# Patient Record
Sex: MALE | Race: WHITE | NOT HISPANIC OR LATINO | Employment: FULL TIME | URBAN - NONMETROPOLITAN AREA
[De-identification: names, ages, dates, MRNs, and addresses within clinical notes are randomized per-mention and may not be internally consistent; named-entity substitution may affect disease eponyms.]

---

## 2018-05-14 ENCOUNTER — OFFICE VISIT CONVERTED (OUTPATIENT)
Dept: FAMILY MEDICINE CLINIC | Age: 28
End: 2018-05-14
Attending: NURSE PRACTITIONER

## 2018-09-05 ENCOUNTER — OFFICE VISIT CONVERTED (OUTPATIENT)
Dept: FAMILY MEDICINE CLINIC | Age: 28
End: 2018-09-05
Attending: NURSE PRACTITIONER

## 2018-12-21 ENCOUNTER — OFFICE VISIT CONVERTED (OUTPATIENT)
Dept: FAMILY MEDICINE CLINIC | Age: 28
End: 2018-12-21
Attending: NURSE PRACTITIONER

## 2019-04-01 ENCOUNTER — OFFICE VISIT CONVERTED (OUTPATIENT)
Dept: UROLOGY | Facility: CLINIC | Age: 29
End: 2019-04-01
Attending: UROLOGY

## 2019-04-26 ENCOUNTER — PROCEDURE VISIT CONVERTED (OUTPATIENT)
Dept: UROLOGY | Facility: CLINIC | Age: 29
End: 2019-04-26
Attending: UROLOGY

## 2020-03-09 ENCOUNTER — OFFICE VISIT CONVERTED (OUTPATIENT)
Dept: FAMILY MEDICINE CLINIC | Age: 30
End: 2020-03-09
Attending: NURSE PRACTITIONER

## 2020-12-19 ENCOUNTER — OFFICE VISIT CONVERTED (OUTPATIENT)
Dept: FAMILY MEDICINE CLINIC | Age: 30
End: 2020-12-19
Attending: NURSE PRACTITIONER

## 2021-05-15 VITALS — WEIGHT: 262 LBS | RESPIRATION RATE: 16 BRPM | HEIGHT: 71 IN | BODY MASS INDEX: 36.68 KG/M2

## 2021-05-18 NOTE — PROGRESS NOTES
Inderjit Lala 1990     Office/Outpatient Visit    Visit Date: Mon, May 14, 2018 03:21 pm    Provider: Fadia Acosta N.P. (Assistant: Remedios Cleveland RN)    Location: [facility]        Electronically signed by Fadia Acosta N.P. on  05/14/2018 05:28:30 PM                             SUBJECTIVE:        CC:     Mr. Lala is a 27 year old White male.  Medication Refill         HPI:         Patient presents with anxiety.  Current treatment includes Zoloft.  Doing well.  He would like a refill.      ROS:     CONSTITUTIONAL:  Negative for chills, fatigue, fever, and weight change.      CARDIOVASCULAR:  Negative for chest pain, palpitations, tachycardia, orthopnea, and edema.      RESPIRATORY:  Negative for cough, dyspnea, and hemoptysis.      INTEGUMENTARY:  Positive for wart(s) (wart on foot).  2 places on his foot, no pain, recently noticed     NEUROLOGICAL:  Negative for dizziness, headaches, paresthesias, and weakness.          [facility]/:     Last Reviewed on 5/14/2018 03:54 PM by Fadia Acosta    Past Medical History:             PAST MEDICAL HISTORY         Chicken pox     Hypertension    Aortic Regurgitation     epididymitis         CURRENT MEDICAL PROVIDERS:    Cardiologist: Dr Dwayne Barry    Dermatologist: sarah silvestre    Urologist: [facility]         PREVENTIVE HEALTH MAINTENANCE             INFLUENZA VACCINE: declines, understands reason for immunization         Surgical History:         Pressure Equalization Tubes: 1994 and 1998;     Arthroscopy: left knee; 2009;     Other Surgeries:    wisdom teeth extractions;         Family History:     Father: Arrhythmia ( Afib ); Congestive Heart Failure; Hypertension; Hyperlipidemia;  Type 2 Diabetes     Mother: Hypertension;  Anxiety; Depression     Brother(s): 2 brother(s) total;  Hypertension     Sister(s): 3 sister(s) total;  Hypertension     Paternal Grandmother: Type 2 Diabetes     Maternal Grandmother: Congestive Heart  Failure         Social History:     Occupation: [employer];     Marital Status:      Children: 1 child         Tobacco/Alcohol/Supplements:     Last Reviewed on 5/14/2018 03:26 PM by Remedios Cleveland    Tobacco: He has never smoked.          Alcohol: Frequency:    rare;     Caffeine:  He admits to consuming caffeine via -rare.              Immunizations:     DTP  (Diphtheria-Tetanus-whole cell Pertussis) 2/25/1993     DTP  (Diphtheria-Tetanus-whole cell Pertussis) 4/12/1993     DTP  (Diphtheria-Tetanus-whole cell Pertussis) 6/9/1993     DTP  (Diphtheria-Tetanus-whole cell Pertussis) 3/30/1994     DTP  (Diphtheria-Tetanus-whole cell Pertussis) 1/17/1997     DT  (Diphtheria-Tetanus); for children under 7 7/12/2004     Td adult 7/12/2004     Hib PRP-OMP (3-dose) 12/25/1993     Hib PRP-OMP (3-dose) 4/12/1993     Hib PRP-OMP (3-dose) 6/9/1993     Hib PRP-OMP (3-dose) 3/30/1994     Hep B (pedi/adol, 3-dose schedule) 1/1/1993     Hep B (pedi/adol, 3-dose schedule) 2/25/1993     Hep B (pedi/adol, 3-dose schedule) 1/5/1994     Merck 4 Valent HPV vaccine 5/22/2003     Merck 4 Valent HPV vaccine 7/30/2003     Merck 4 Valent HPV vaccine 12/1/2003     Novartis Flu P12 10/9/2007     Hep A, adult dose 4/25/2018     OPV  Poliovirus, live (oral) 2/25/1993     OPV  Poliovirus, live (oral) 4/12/1993     OPV  Poliovirus, live (oral) 3/30/1994     OPV  Poliovirus, live (oral) 1992     MMR  (Measles-Mumps-Rubella), live 1/17/1997     MMR  (Measles-Mumps-Rubella), live 3/30/1994     Varicella, live 1/17/1997     FluMist 10/14/2008     FluMist 8/31/2009     FluMist 10/18/2010     FluMist 9/19/2011     FluMist 10/19/2012     Flumist Quadrivalent 10/31/2013     Influenza A (H1N1) NASAL, Monovalent Live 10/15/2009     Study Fluarix 11/2/2006     Menactra (Meningococcal MCV4P) 6/6/2008     Adacel (Tdap) 5/13/2011     HPV, quadrivalent 5/22/2003     HPV, quadrivalent 7/30/2003     HPV, quadrivalent 12/1/2003         Allergies:     Last  Reviewed on 5/14/2018 03:23 PM by Remedios Cleveland      No Known Drug Allergies.         Current Medications:     Last Reviewed on 5/14/2018 03:24 PM by Remedios Cleveland    Zoloft 50mg Tablet 1 a day     Propranolol HCl 120mg Capsules, Sustained Release Take 1 capsule(s) by mouth daily         OBJECTIVE:        Vitals:         Current: 5/14/2018 3:23:39 PM    Ht:  5 ft, 10 in;  Wt: 239.4 lbs;  BMI: 34.4    T: 98.6 F (oral);  BP: 122/61 mm Hg (left arm, sitting);  P: 54 bpm (left arm (BP Cuff), sitting);  sCr: 1.1 mg/dL;  GFR: 111.29        Exams:     PHYSICAL EXAM:     GENERAL: Vitals recorded well developed, well nourished;  well groomed;  no apparent distress;     RESPIRATORY: normal respiratory rate and pattern with no distress; normal breath sounds with no rales, rhonchi, wheezes or rubs;     CARDIOVASCULAR: normal rate; rhythm is regular;  no systolic murmur;     SKIN: tiny flesh colored papule on plantar surface of right great toe and another tiny flat lesion on  medial forefoot; plantar surface     PSYCHIATRIC:  appropriate affect and demeanor; normal speech pattern; grossly normal memory;         ASSESSMENT           300.02   F41.9  Anxiety              DDx:     078.19   B07.9  Common wart              DDx:         ORDERS:         Meds Prescribed:       Refill of: Zoloft (Sertraline HCl) 50mg Tablet 1 a day  #90 (Ninety) tablet(s) Refills: 3                 PLAN:          Anxiety continue zoloft         FOLLOW-UP: Schedule follow-up appointments on a p.r.n. basis.            Prescriptions:       Refill of: Zoloft (Sertraline HCl) 50mg Tablet 1 a day  #90 (Ninety) tablet(s) Refills: 3          Common wart discussed warts, can try OTC treatment if symptoms increase or follow up             Patient Recommendations:        For  Anxiety:     Schedule follow-up appointments as needed.              CHARGE CAPTURE           **Please note: ICD descriptions below are intended for billing purposes only and may not  represent clinical diagnoses**        Primary Diagnosis:         300.02 Anxiety            F41.9    Anxiety disorder, unspecified              Orders:          81531   Office/outpatient visit; established patient, level 3  (In-House)           078.19 Common wart            B07.9    Viral wart, unspecified

## 2021-05-18 NOTE — PROGRESS NOTES
Inderjit Lala  1990     Office/Outpatient Visit    Visit Date: Sat, Dec 19, 2020 09:13 am    Provider: Loren Fallon N.P. (Assistant: Fadia Loera LPN)    Location: [facility]        Electronically signed by Loren Fallon N.P. on  12/19/2020 10:44:46 AM                             Subjective:        CC: Mr. Lala is a 29 year old White male.  [facility] needs [facility] to [facility]         HPI:       Here for recent positive Covid + on 12/9/2020, has been home since and is feeling better, no fever, no cough, no n/v/d/c and ready to return to work     ROS:     CONSTITUTIONAL:  Negative for chills, fatigue, fever and weight change.      CARDIOVASCULAR:  Negative for chest pain, orthopnea, paroxysmal nocturnal dyspnea and pedal edema.      RESPIRATORY:  Negative for dyspnea and cough.      GASTROINTESTINAL:  Negative for abdominal pain, heartburn, constipation, diarrhea, and stool changes.      PSYCHIATRIC:  Negative for anxiety and depression.          Past Medical History / Family History / Social History:         Last Reviewed on 12/19/2020 10:44 AM by Loren Fallon    Past Medical History:             PAST MEDICAL HISTORY         Chicken pox     Hypertension    Aortic Regurgitation     epididymitis         CURRENT MEDICAL PROVIDERS:    Cardiologist: Dr Dwayne Barry    Dermatologist: sarah silvestre    Urologist: [facility]         PREVENTIVE HEALTH MAINTENANCE             INFLUENZA VACCINE: declines, understands reason for immunization         Surgical History:         Pressure Equalization Tubes: 1994 and 1998;     Arthroscopy: left knee; 2009;     Other Surgeries:    Vasectomy: 5-2019;     wisdom teeth extractions;         Family History:     Father: Arrhythmia ( Afib ); Congestive Heart Failure; Hypertension; Hyperlipidemia;  Type 2 Diabetes     Mother: Hypertension;  Anxiety; Depression     Brother(s): 2 brother(s) total;  Hypertension     Sister(s): 3  sister(s) total;  Hypertension     Paternal Grandmother: Type 2 Diabetes     Maternal Grandmother: Congestive Heart Failure         Social History:     Occupation: [employer];     Marital Status:      Children: 2 children         Tobacco/Alcohol/Supplements:     Last Reviewed on 12/19/2020 10:44 AM by Loren Fallon    Tobacco: He has never smoked.          Alcohol: Frequency:    rare;     Caffeine:  He admits to consuming caffeine via -rare.          Current Problems:     Last Reviewed on 12/19/2020 10:44 AM by Loren Fallon    Anxiety disorder, unspecified    COVID-19    Encounter for immunization        Immunizations:     DTP  (Diphtheria-Tetanus-whole cell Pertussis) 2/25/1993    DTP  (Diphtheria-Tetanus-whole cell Pertussis) 4/12/1993    DTP  (Diphtheria-Tetanus-whole cell Pertussis) 6/9/1993    DTP  (Diphtheria-Tetanus-whole cell Pertussis) 3/30/1994    DTP  (Diphtheria-Tetanus-whole cell Pertussis) 1/17/1997    DT  (Diphtheria-Tetanus); for children under 7 7/12/2004    Td adult 7/12/2004    Hib PRP-OMP (3-dose) 12/25/1993    Hib PRP-OMP (3-dose) 4/12/1993    Hib PRP-OMP (3-dose) 6/9/1993    Hib PRP-OMP (3-dose) 3/30/1994    Hep B (pedi/adol, 3-dose schedule) 1/1/1993    Hep B (pedi/adol, 3-dose schedule) 2/25/1993    Hep B (pedi/adol, 3-dose schedule) 1/5/1994    Merck 4 Valent HPV vaccine 5/22/2003    Merck 4 Valent HPV vaccine 7/30/2003    Merck 4 Valent HPV vaccine 12/1/2003    Novartis Flu P12 10/9/2007    Hep A, adult dose 4/25/2018    Hep A, adult dose 11/1/2018    OPV  Poliovirus, live (oral) 2/25/1993    OPV  Poliovirus, live (oral) 4/12/1993    OPV  Poliovirus, live (oral) 3/30/1994    OPV  Poliovirus, live (oral) 1992    MMR  (Measles-Mumps-Rubella), live 1/17/1997    MMR  (Measles-Mumps-Rubella), live 3/30/1994    Varicella, live 1/17/1997    Fluzone Quadrivalent (3+ years) 11/1/2018    FluMist 10/14/2008    FluMist 8/31/2009    FluMist 10/18/2010    FluMist 9/19/2011    FluMist  10/19/2012    Flumist Quadrivalent 10/31/2013    Influenza A (H1N1) NASAL, Monovalent Live 10/15/2009    Study Fluarix 11/2/2006    Menactra (Meningococcal MCV4P) 6/6/2008    Adacel (Tdap) 5/13/2011    HPV, quadrivalent 5/22/2003    HPV, quadrivalent 7/30/2003    HPV, quadrivalent 12/1/2003        Allergies:     Last Reviewed on 12/19/2020 10:44 AM by Loren Fallon    No Known Allergies.        Current Medications:     Last Reviewed on 12/19/2020 10:44 AM by Loren Fallon    propranoloL 120 mg oral Capsule, Extended Release 24 hr [Take 2 capsule(s) by mouth daily]    sertraline 50 mg oral tablet [TAKE 1 TABLET DAILY]        Objective:        Vitals:         Current: 12/19/2020 9:18:51 AM    Ht:  5 ft, 10 in;  Wt: 276.8 lbs;  BMI: 39.7T: 97.3 F (oral);  BP: 139/65 mm Hg (left arm, sitting);  P: 47 bpm (left arm (BP Cuff), sitting);  sCr: 1.1 mg/dL;  GFR: 116.36        Exams:     PHYSICAL EXAM:     GENERAL: Vitals recorded well developed, well nourished;  well groomed;  no apparent distress;     RESPIRATORY: normal respiratory rate and pattern with no distress; normal breath sounds with no rales, rhonchi, wheezes or rubs;     CARDIOVASCULAR: normal rate; rhythm is regular;  normal S1; normal S2; no systolic murmur; no cyanosis; no edema;     NEUROLOGIC: GROSSLY INTACT     PSYCHIATRIC:  appropriate affect and demeanor; normal speech pattern; grossly normal memory;         Procedures:     Encounter for immunization    1.              Assessment:         U07.1   COVID-19       Z23   Encounter for immunization           ORDERS:         Procedures Ordered:       77684  Immunization administration; one vaccine  (In-House)              Other Orders:       95213  Influenza virus vaccine, quadrivalent, split virus, preservative free 3 years of age & older  (In-House)                      Plan:         COVID-19Return to work full duty 12/21/2020        Encounter for immunization          Immunizations:       13097   Immunization administration; one vaccine  (In-House)            06399  Influenza virus vaccine, quadrivalent, split virus, preservative free 3 years of age & older  (In-House)                Dose (ml): 0.5  Site: left deltoid  Route: intramuscular  Administered by: Fadia Loera          : [employer]  Lot #: [id number]  Exp: 06/30/2021          NDC: [address]-61            Charge Capture:         Primary Diagnosis:     U07.1  COVID-19           Orders:      29006  Office/outpatient visit; established patient, level 3  (In-House)              Z23  Encounter for immunization           Orders:      86659  Immunization administration; one vaccine  (In-House)            22556  Influenza virus vaccine, quadrivalent, split virus, preservative free 3 years of age & older  (In-House)

## 2021-05-18 NOTE — PROGRESS NOTES
Inderjit Lala  1990     Office/Outpatient Visit    Visit Date: Mon, Mar 9, 2020 03:54 pm    Provider: Fadia Acosta N.P. (Assistant: Yesi Eubanks MA)    Location: [facility]        Electronically signed by Fadia Acosta N.P. on  03/09/2020 04:25:18 PM                             Subjective:        CC: Mr. Lala is a 29 year old White male.  This is a follow-up visit.  check up, also discuss sleep apnea         HPI:           Essential (primary) hypertension details; his current cardiac medication regimen includes a beta-blocker ( Inderal LA ).  Compliance with treatment has been good; he takes his medication as directed.  followed by cardiology, history of heart murmur           Anxiety disorder, unspecified details; current treatment includes Zoloft.  Needs refills. Working well.      ROS:     CONSTITUTIONAL:  Positive for fatigue.      CARDIOVASCULAR:  Negative for chest pain, palpitations, tachycardia, orthopnea, and edema.      RESPIRATORY:  Positive for snoring.   Negative for chronic cough or dyspnea.      NEUROLOGICAL:  Negative for dizziness, headaches, paresthesias, and weakness.          Past Medical History / Family History / Social History:         Last Reviewed on 3/09/2020 04:10 PM by Fadia Acosta    Past Medical History:             PAST MEDICAL HISTORY         Chicken pox     Hypertension    Aortic Regurgitation     epididymitis         CURRENT MEDICAL PROVIDERS:    Cardiologist: Dr Dwayne Barry    Dermatologist: sarah silvestre    Urologist: [facility]         PREVENTIVE HEALTH MAINTENANCE             INFLUENZA VACCINE: declines, understands reason for immunization         Surgical History:         Pressure Equalization Tubes: 1994 and 1998;     Arthroscopy: left knee; 2009;     Other Surgeries:    Vasectomy: 5-2019;     wisdom teeth extractions;         Family History:     Father: Arrhythmia ( Afib ); Congestive Heart Failure; Hypertension;  Hyperlipidemia;  Type 2 Diabetes     Mother: Hypertension;  Anxiety; Depression     Brother(s): 2 brother(s) total;  Hypertension     Sister(s): 3 sister(s) total;  Hypertension     Paternal Grandmother: Type 2 Diabetes     Maternal Grandmother: Congestive Heart Failure         Social History:     Occupation: [employer];     Marital Status:      Children: 2 children         Tobacco/Alcohol/Supplements:     Last Reviewed on 12/21/2018 04:00 PM by Mercedes Kern    Tobacco: He has never smoked.          Alcohol: Frequency:    rare;     Caffeine:  He admits to consuming caffeine via -rare.          Immunizations:     DTP  (Diphtheria-Tetanus-whole cell Pertussis) 2/25/1993    DTP  (Diphtheria-Tetanus-whole cell Pertussis) 4/12/1993    DTP  (Diphtheria-Tetanus-whole cell Pertussis) 6/9/1993    DTP  (Diphtheria-Tetanus-whole cell Pertussis) 3/30/1994    DTP  (Diphtheria-Tetanus-whole cell Pertussis) 1/17/1997    DT  (Diphtheria-Tetanus); for children under 7 7/12/2004    Td adult 7/12/2004    Hib PRP-OMP (3-dose) 12/25/1993    Hib PRP-OMP (3-dose) 4/12/1993    Hib PRP-OMP (3-dose) 6/9/1993    Hib PRP-OMP (3-dose) 3/30/1994    Hep B (pedi/adol, 3-dose schedule) 1/1/1993    Hep B (pedi/adol, 3-dose schedule) 2/25/1993    Hep B (pedi/adol, 3-dose schedule) 1/5/1994    Merck 4 Valent HPV vaccine 5/22/2003    Merck 4 Valent HPV vaccine 7/30/2003    Merck 4 Valent HPV vaccine 12/1/2003    Novartis Flu P12 10/9/2007    Hep A, adult dose 4/25/2018    Hep A, adult dose 11/1/2018    OPV  Poliovirus, live (oral) 2/25/1993    OPV  Poliovirus, live (oral) 4/12/1993    OPV  Poliovirus, live (oral) 3/30/1994    OPV  Poliovirus, live (oral) 1992    MMR  (Measles-Mumps-Rubella), live 1/17/1997    MMR  (Measles-Mumps-Rubella), live 3/30/1994    Varicella, live 1/17/1997    Fluzone Quadrivalent (3+ years) 11/1/2018    FluMist 10/14/2008    FluMist 8/31/2009    FluMist 10/18/2010    FluMist 9/19/2011    FluMist 10/19/2012     Flumist Quadrivalent 10/31/2013    Influenza A (H1N1) NASAL, Monovalent Live 10/15/2009    Study Fluarix 11/2/2006    Menactra (Meningococcal MCV4P) 6/6/2008    Adacel (Tdap) 5/13/2011    HPV, quadrivalent 5/22/2003    HPV, quadrivalent 7/30/2003    HPV, quadrivalent 12/1/2003        Allergies:     Last Reviewed on 3/09/2020 04:03 PM by Yesi Eubanks    No Known Allergies.        Current Medications:     Last Reviewed on 3/09/2020 04:03 PM by Yesi Eubanks    propranoloL 120 mg oral Capsule, Extended Release 24 hr [Take 2 capsule(s) by mouth daily]    Zoloft 50 mg oral tablet [1 a day]        Objective:        Vitals:         Current: 3/9/2020 4:02:56 PM    Ht:  5 ft, 10 in;  Wt: 262 lbs;  BMI: 37.6T: 98.1 F (oral);  BP: 130/79 mm Hg (left arm, sitting);  P: 64 bpm (left arm (BP Cuff), sitting);  sCr: 1.1 mg/dL;  GFR: 113.67        Exams:     PHYSICAL EXAM:     GENERAL: Vitals recorded well developed, well nourished;  well groomed;  no apparent distress;     NECK: carotid exam reveals no bruits;     RESPIRATORY: normal respiratory rate and pattern with no distress; normal breath sounds with no rales, rhonchi, wheezes or rubs;     CARDIOVASCULAR: normal rate; rhythm is regular;  a systolic murmur is noted; no edema;     PSYCHIATRIC:  appropriate affect and demeanor; normal speech pattern; grossly normal memory;         Assessment:         Z13.31   Encounter for screening for depression       I10   Essential (primary) hypertension       F41.9   Anxiety disorder, unspecified       R53.83   Other fatigue           ORDERS:         Meds Prescribed:       [Refilled] Zoloft 50 mg oral tablet [1 a day], #90 (ninety) tablets, Refills: 1 (one)         Lab Orders:       FUTURE  Future order to be done at patients convenience  (Send-Out)            59315  [facility] CMP AND LIPID: [id number]  (Send-Out)            FUTURE  Future order to be done at patients convenience  (Send-Out)            89[facility]  [facility]  [facility]  (Send-Out)            79295  [facility] TSH  (Send-Out)              Procedures Ordered:       REFER  Referral to Specialist or Other Facility  (Send-Out)              Other Orders:         Depression screen negative  (In-House)                      Plan:         Encounter for screening for depression    MIPS PHQ-9 Depression Screening: Completed form scanned and in chart; Total Score 4; Negative Depression Screen           Orders:         Depression screen negative  (In-House)              Essential (primary) hypertensionkeep appt with cardiology for 4-2020/will fwd labs to cardiology        FOLLOW-UP TESTING #1: FOLLOW-UP LABORATORY:  Labs to be scheduled in the future include HTN/Lipid Panel: CMP, Lipid.      RECOMMENDATIONS given include: weight loss.      FOLLOW-UP: cardiology as directed           Orders:       FUTURE  Future order to be done at patients convenience  (Send-Out)            13303  [facility] CMP AND LIPID: [id number], [id number]  (Send-Out)              Anxiety disorder, unspecified          Prescriptions:       [Refilled] Zoloft 50 mg oral tablet [1 a day], #90 (ninety) tablets, Refills: 1 (one)         Other fatiguewill check some labs, he will return to the lab fasting         REFERRALS:  Referral initiated to pulm / sleep study for fatigue and snoring.      FOLLOW-UP TESTING #1: FOLLOW-UP LABORATORY:  Labs to be scheduled in the future include CBC and TSH.            Orders:       FUTURE  Future order to be done at patients convenience  (Send-Out)            88669  [facility] CBC with 3 part diff  (Send-Out)            22085  [facility] TSH  (Send-Out)            REFER  Referral to Specialist or Other Facility  (Send-Out)                  Patient Recommendations:        For  Essential (primary) hypertension:            The following laboratory testing has been ordered: Try to lose some weight; even modest weight reduction can improve your blood pressure.          For  Other  fatigue:    I also recommend pulm / sleep study for fatigue and snoring.          The following laboratory testing has been ordered: CBC TSH             Charge Capture:         Primary Diagnosis:     Z13.31  Encounter for screening for depression           Orders:      25967  Office/outpatient visit; established patient, level 4  (In-House)              Depression screen negative  (In-House)              I10  Essential (primary) hypertension     F41.9  Anxiety disorder, unspecified     R53.83  Other fatigue

## 2021-05-18 NOTE — PROGRESS NOTES
Inderjit Lala 1990     Office/Outpatient Visit    Visit Date: Fri, Dec 21, 2018 03:34 pm    Provider: Mercedes Kern N.P. (Assistant: Kay Chaudhry MA)    Location: [facility]        Electronically signed by Mercedes Kern N.P. on  12/21/2018 05:57:09 PM                             SUBJECTIVE:        CC:     Mr. Lala is a 27 year old White male.  presents today due to complaints of cough, congestion, sore throat X 1 day         HPI:         He complains of a sore throat.  Pt states sore throat x 2 days. States sneezing, productive yellow/green cough, sinus congestion. Denies ear pain, fever.  Has taken zyrtec in the past but not recently.     ROS:     CONSTITUTIONAL:  Negative for chills, fatigue, fever and weight change.      CARDIOVASCULAR:  Negative for chest pain, orthopnea, paroxysmal nocturnal dyspnea and pedal edema.      RESPIRATORY:  Negative for dyspnea and cough.      GASTROINTESTINAL:  Negative for abdominal pain, heartburn, constipation, diarrhea, and stool changes.      NEUROLOGICAL:  Negative for dizziness, headaches, paresthesias, and weakness.      PSYCHIATRIC:  Negative for anxiety and depression.          [facility]/SH:     Last Reviewed on 12/21/2018 04:00 PM by Mercedes Kern    Past Medical History:             PAST MEDICAL HISTORY         Chicken pox     Hypertension    Aortic Regurgitation     epididymitis         CURRENT MEDICAL PROVIDERS:    Cardiologist: Dr Dwayne Barry    Dermatologist: sarah silvestre    Urologist: [facility]         PREVENTIVE HEALTH MAINTENANCE             INFLUENZA VACCINE: declines, understands reason for immunization         Surgical History:         Pressure Equalization Tubes: 1994 and 1998;     Arthroscopy: left knee; 2009;     Other Surgeries:    wisdom teeth extractions;         Family History:     Father: Arrhythmia ( Afib ); Congestive Heart Failure; Hypertension; Hyperlipidemia;  Type 2 Diabetes     Mother:  Hypertension;  Anxiety; Depression     Brother(s): 2 brother(s) total;  Hypertension     Sister(s): 3 sister(s) total;  Hypertension     Paternal Grandmother: Type 2 Diabetes     Maternal Grandmother: Congestive Heart Failure         Social History:     Occupation: [employer];     Marital Status:      Children: 1 child         Tobacco/Alcohol/Supplements:     Last Reviewed on 12/21/2018 04:00 PM by Mercedes Kern    Tobacco: He has never smoked.          Alcohol: Frequency:    rare;     Caffeine:  He admits to consuming caffeine via -rare.          Substance Abuse History:     Last Reviewed on 12/21/2018 04:00 PM by Mercedes Kern    NEGATIVE         Mental Health History:     Last Reviewed on 12/21/2018 04:00 PM by Mercedes Kern        Communicable Diseases (eg STDs):     Last Reviewed on 12/21/2018 04:00 PM by Mercedes Kern            Current Problems:     Last Reviewed on 12/21/2018 04:00 PM by Mercedes Kern    Anxiety     Aortic valve disorder     Aortic valve insufficiency     Hypertension         Immunizations:     DTP  (Diphtheria-Tetanus-whole cell Pertussis) 2/25/1993     DTP  (Diphtheria-Tetanus-whole cell Pertussis) 4/12/1993     DTP  (Diphtheria-Tetanus-whole cell Pertussis) 6/9/1993     DTP  (Diphtheria-Tetanus-whole cell Pertussis) 3/30/1994     DTP  (Diphtheria-Tetanus-whole cell Pertussis) 1/17/1997     DT  (Diphtheria-Tetanus); for children under 7 7/12/2004     Td adult 7/12/2004     Hib PRP-OMP (3-dose) 12/25/1993     Hib PRP-OMP (3-dose) 4/12/1993     Hib PRP-OMP (3-dose) 6/9/1993     Hib PRP-OMP (3-dose) 3/30/1994     Hep B (pedi/adol, 3-dose schedule) 1/1/1993     Hep B (pedi/adol, 3-dose schedule) 2/25/1993     Hep B (pedi/adol, 3-dose schedule) 1/5/1994     Merck 4 Valent HPV vaccine 5/22/2003     Merck 4 Valent HPV vaccine 7/30/2003     Merck 4 Valent HPV vaccine 12/1/2003     Novartis Flu P12 10/9/2007     Hep A, adult dose 4/25/2018     Hep A, adult dose 11/1/2018      OPV  Poliovirus, live (oral) 2/25/1993     OPV  Poliovirus, live (oral) 4/12/1993     OPV  Poliovirus, live (oral) 3/30/1994     OPV  Poliovirus, live (oral) 1992     MMR  (Measles-Mumps-Rubella), live 1/17/1997     MMR  (Measles-Mumps-Rubella), live 3/30/1994     Varicella, live 1/17/1997     Fluzone Quadrivalent (3+ years) 11/1/2018     FluMist 10/14/2008     FluMist 8/31/2009     FluMist 10/18/2010     FluMist 9/19/2011     FluMist 10/19/2012     Flumist Quadrivalent 10/31/2013     Influenza A (H1N1) NASAL, Monovalent Live 10/15/2009     Study Fluarix 11/2/2006     Menactra (Meningococcal MCV4P) 6/6/2008     Adacel (Tdap) 5/13/2011     HPV, quadrivalent 5/22/2003     HPV, quadrivalent 7/30/2003     HPV, quadrivalent 12/1/2003         Allergies:     Last Reviewed on 12/21/2018 04:00 PM by Mercedes Kern      No Known Drug Allergies.         Current Medications:     Last Reviewed on 12/21/2018 04:00 PM by Mercedes Kern    Zoloft 50mg Tablet 1 a day     Propranolol HCl 120mg Capsules, Sustained Release Take 1 capsule(s) by mouth daily         OBJECTIVE:        Vitals:         Current: 12/21/2018 3:38:31 PM    Ht:  5 ft, 10 in;  Wt: 257.2 lbs;  BMI: 36.9    T: 98.4 F (oral);  BP: 144/60 mm Hg (left arm, sitting);  P: 54 bpm (left arm (BP Cuff), sitting);  sCr: 1.1 mg/dL;  GFR: 114.74        Exams:     PHYSICAL EXAM:     GENERAL: Vitals recorded well developed, well nourished;  well groomed;  no apparent distress;     EYES: lids and lacrimal system are normal in appearance; extraocular movements intact; conjunctiva and cornea are normal; PERRLA;     E/N/T: EARS: external auditory canal erythematous on the left;  NOSE: nasal mucosa is erythematous;  bilateral maxillary sinus tenderness present; OROPHARYNX: oral mucosa reveals erythema;     NECK:  supple, full ROM; no thyromegaly; no carotid bruits;     RESPIRATORY: normal respiratory rate and pattern with no distress; normal breath sounds with no rales,  rhonchi, wheezes or rubs;     CARDIOVASCULAR: normal rate; rhythm is regular;  normal S1; normal S2; no systolic murmur; no cyanosis; no edema;     GASTROINTESTINAL: nontender, nondistended; no hepatosplenomegaly or masses; no bruits;     SKIN:  no significant rashes or lesions; no suspicious moles;     MUSCULOSKELETAL:  Normal range of motion, strength and tone;     NEUROLOGICAL:  cranial nerves, motor and sensory function, reflexes, gait and coordination are all intact;     PSYCHIATRIC:  appropriate affect and demeanor; normal speech pattern; grossly normal memory;         ASSESSMENT:           462   J02.9  Acute pharyngitis              DDx:     382.00   H66.002  L otitis media              DDx:         ORDERS:         Meds Prescribed:       Augmentin (Amoxicillin/Clavulanate) 875mg/125mg Tablet 1 tab bid X 10 days  #20 (Twenty) tablet(s) Refills: 0       Allegra Allergy 24 Hour (Fexofenadine HCl) 180mg Tablet 1 tab daily  #30 (Thirty) tablet(s) Refills: 2                 PLAN:          Acute pharyngitis         FOLLOW-UP: Advised to call if there is no improvement..            Prescriptions:       Augmentin (Amoxicillin/Clavulanate) 875mg/125mg Tablet 1 tab bid X 10 days  #20 (Twenty) tablet(s) Refills: 0       Allegra Allergy 24 Hour (Fexofenadine HCl) 180mg Tablet 1 tab daily  #30 (Thirty) tablet(s) Refills: 2           Patient Education Handouts:       Antibiotics              Patient Recommendations:        For  Acute pharyngitis:                     APPOINTMENT INFORMATION:        Monday Tuesday Wednesday Thursday Friday Saturday Sunday            Time:___________________AM  PM   Date:_____________________             CHARGE CAPTURE:           Primary Diagnosis:     462 Acute pharyngitis            J02.9    Acute pharyngitis, unspecified              Orders:          43058   Office/outpatient visit; established patient, level 3  (In-House)           382.00 L otitis media            H66.002    Acute  suppurative otitis media without spontaneous rupture of ear drum, left ear

## 2021-05-18 NOTE — PROGRESS NOTES
Inderjit Lala. 1990     Office/Outpatient Visit    Visit Date: Wed, Sep 5, 2018 12:08 pm    Provider: Fadia Acosta N.P. (Assistant: Emmy Ann LPN)    Location: [facility]        Electronically signed by Fadia Acosta N.P. on  09/05/2018 01:02:18 PM                             SUBJECTIVE:        CC:     Mr. Lala is a 27 year old White male.  spot on back of head         HPI:         Patient complains of insect bite.  These bites or stings seem to have started  around 5 days ago.  He denies any associated systemic symptoms.  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      CARDIOVASCULAR:  Negative for chest pain, palpitations, tachycardia, orthopnea, and edema.      RESPIRATORY:  Negative for cough, dyspnea, and hemoptysis.      INTEGUMENTARY:  Negative for rash.      NEUROLOGICAL:  Negative for dizziness, headaches, paresthesias, and weakness.          [facility]/SH:     Last Reviewed on 9/05/2018 12:57 PM by Fadia Acosta    Past Medical History:             PAST MEDICAL HISTORY         Chicken pox     Hypertension    Aortic Regurgitation     epididymitis         CURRENT MEDICAL PROVIDERS:    Cardiologist: Dr Dwayne Barry    Dermatologist: sarah silvestre    Urologist: [facility]         PREVENTIVE HEALTH MAINTENANCE             INFLUENZA VACCINE: declines, understands reason for immunization         Surgical History:         Pressure Equalization Tubes: 1994 and 1998;     Arthroscopy: left knee; 2009;     Other Surgeries:    wisdom teeth extractions;         Family History:     Father: Arrhythmia ( Afib ); Congestive Heart Failure; Hypertension; Hyperlipidemia;  Type 2 Diabetes     Mother: Hypertension;  Anxiety; Depression     Brother(s): 2 brother(s) total;   Hypertension     Sister(s): 3 sister(s) total;  Hypertension     Paternal Grandmother: Type 2 Diabetes     Maternal Grandmother: Congestive Heart Failure         Social History:     Occupation: [employer];     Marital Status:      Children: 1 child         Tobacco/Alcohol/Supplements:     Last Reviewed on 9/05/2018 12:13 PM by Emmy Ann April    Tobacco: He has never smoked.          Alcohol: Frequency:    rare;     Caffeine:  He admits to consuming caffeine via -rare.              Immunizations:     DTP  (Diphtheria-Tetanus-whole cell Pertussis) 2/25/1993     DTP  (Diphtheria-Tetanus-whole cell Pertussis) 4/12/1993     DTP  (Diphtheria-Tetanus-whole cell Pertussis) 6/9/1993     DTP  (Diphtheria-Tetanus-whole cell Pertussis) 3/30/1994     DTP  (Diphtheria-Tetanus-whole cell Pertussis) 1/17/1997     DT  (Diphtheria-Tetanus); for children under 7 7/12/2004     Td adult 7/12/2004     Hib PRP-OMP (3-dose) 12/25/1993     Hib PRP-OMP (3-dose) 4/12/1993     Hib PRP-OMP (3-dose) 6/9/1993     Hib PRP-OMP (3-dose) 3/30/1994     Hep B (pedi/adol, 3-dose schedule) 1/1/1993     Hep B (pedi/adol, 3-dose schedule) 2/25/1993     Hep B (pedi/adol, 3-dose schedule) 1/5/1994     Merck 4 Valent HPV vaccine 5/22/2003     Merck 4 Valent HPV vaccine 7/30/2003     Merck 4 Valent HPV vaccine 12/1/2003     Novartis Flu P12 10/9/2007     Hep A, adult dose 4/25/2018     OPV  Poliovirus, live (oral) 2/25/1993     OPV  Poliovirus, live (oral) 4/12/1993     OPV  Poliovirus, live (oral) 3/30/1994     OPV  Poliovirus, live (oral) 1992     MMR  (Measles-Mumps-Rubella), live 1/17/1997     MMR  (Measles-Mumps-Rubella), live 3/30/1994     Varicella, live 1/17/1997     FluMist 10/14/2008     FluMist 8/31/2009     FluMist 10/18/2010     FluMist 9/19/2011     FluMist 10/19/2012     Flumist Quadrivalent 10/31/2013     Influenza A (H1N1) NASAL, Monovalent Live 10/15/2009     Study Fluarix 11/2/2006     Menactra (Meningococcal MCV4P)  6/6/2008     Adacel (Tdap) 5/13/2011     HPV, quadrivalent 5/22/2003     HPV, quadrivalent 7/30/2003     HPV, quadrivalent 12/1/2003         Allergies:     Last Reviewed on 9/05/2018 12:13 PM by Emmy Ann      No Known Drug Allergies.         Current Medications:     Last Reviewed on 9/05/2018 12:14 PM by Emmy Ann    Zoloft 50mg Tablet 1 a day     Propranolol HCl 120mg Capsules, Sustained Release Take 1 capsule(s) by mouth daily         OBJECTIVE:        Vitals:         Current: 9/5/2018 12:13:49 PM    Ht:  5 ft, 10 in;  Wt: 248.2 lbs;  BMI: 35.6    T: 97.3 F (oral);  BP: 124/63 mm Hg (left arm, sitting);  P: 49 bpm (left arm (BP Cuff), sitting);  sCr: 1.1 mg/dL;  GFR: 113.01        Exams:     PHYSICAL EXAM:     GENERAL: Vitals recorded well developed, well nourished;  well groomed;  no apparent distress;     RESPIRATORY: normal respiratory rate and pattern with no distress; normal breath sounds with no rales, rhonchi, wheezes or rubs;     CARDIOVASCULAR: normal rate; rhythm is regular;     LYMPHATIC: no enlargement of cervical or facial nodes;     SKIN: insect bite(s) located on scalp,; two tiny areas on left occipital scalp, non tender and no exudate, no rashes     PSYCHIATRIC:  appropriate affect and demeanor; normal speech pattern; grossly normal memory;         ASSESSMENT           919.4   S00.06XA  Insect bite              DDx:         PLAN:          Insect bite can use topical OTC antibiotic, observe, if anything changes, let me know         FOLLOW-UP: Advised to call if there is no improvement Follow-up by phone if no improvement in 1 week..  .              Patient Recommendations:        For  Insect bite:     Follow-up by phone if no improvement in 1 week.                APPOINTMENT INFORMATION:        Monday Tuesday Wednesday Thursday Friday Saturday Sunday            Time:___________________AM  PM   Date:_____________________             CHARGE CAPTURE           **Please  note: ICD descriptions below are intended for billing purposes only and may not represent clinical diagnoses**        Primary Diagnosis:         919.4 Insect bite            S00.06XA    Insect bite (nonvenomous) of scalp, initial encounter              Orders:          [id number]   Office/outpatient visit; established patient, level 3  (In-House)

## 2021-07-01 VITALS
TEMPERATURE: 98.4 F | DIASTOLIC BLOOD PRESSURE: 60 MMHG | HEART RATE: 54 BPM | HEIGHT: 70 IN | SYSTOLIC BLOOD PRESSURE: 144 MMHG | WEIGHT: 257.2 LBS | BODY MASS INDEX: 36.82 KG/M2

## 2021-07-01 VITALS
DIASTOLIC BLOOD PRESSURE: 63 MMHG | BODY MASS INDEX: 35.53 KG/M2 | WEIGHT: 248.2 LBS | TEMPERATURE: 97.3 F | HEIGHT: 70 IN | SYSTOLIC BLOOD PRESSURE: 124 MMHG | HEART RATE: 49 BPM

## 2021-07-01 VITALS
WEIGHT: 239.4 LBS | TEMPERATURE: 98.6 F | DIASTOLIC BLOOD PRESSURE: 61 MMHG | BODY MASS INDEX: 34.27 KG/M2 | HEART RATE: 54 BPM | SYSTOLIC BLOOD PRESSURE: 122 MMHG | HEIGHT: 70 IN

## 2021-07-02 VITALS
SYSTOLIC BLOOD PRESSURE: 130 MMHG | BODY MASS INDEX: 37.51 KG/M2 | HEIGHT: 70 IN | TEMPERATURE: 98.1 F | WEIGHT: 262 LBS | HEART RATE: 64 BPM | DIASTOLIC BLOOD PRESSURE: 79 MMHG

## 2021-07-02 VITALS
WEIGHT: 276.8 LBS | TEMPERATURE: 97.3 F | SYSTOLIC BLOOD PRESSURE: 139 MMHG | BODY MASS INDEX: 39.63 KG/M2 | HEART RATE: 47 BPM | DIASTOLIC BLOOD PRESSURE: 65 MMHG | HEIGHT: 70 IN

## 2021-08-20 NOTE — TELEPHONE ENCOUNTER
Rx Refill Note  Requested Prescriptions      No prescriptions requested or ordered in this encounter      Last office visit with prescribing clinician: 12/19/20      Next office visit with prescribing clinician: 8/25/2021     {TIP  Is Refill Pharmacy correct? yes  Lissy Quezada  08/20/21, 14:52 EDT

## 2021-12-30 RX ORDER — PREDNISONE 10 MG/1
TABLET ORAL
Qty: 15 TABLET | Refills: 0 | Status: SHIPPED | OUTPATIENT
Start: 2021-12-30 | End: 2022-01-09

## 2022-02-28 ENCOUNTER — HOSPITAL ENCOUNTER (OUTPATIENT)
Dept: GENERAL RADIOLOGY | Facility: HOSPITAL | Age: 32
Discharge: HOME | End: 2022-02-28
Admitting: FAMILY MEDICINE

## 2022-02-28 ENCOUNTER — OFFICE VISIT (OUTPATIENT)
Dept: FAMILY MEDICINE CLINIC | Age: 32
End: 2022-02-28

## 2022-02-28 ENCOUNTER — OFFICE VISIT (OUTPATIENT)
Dept: ORTHOPEDIC SURGERY | Facility: CLINIC | Age: 32
End: 2022-02-28

## 2022-02-28 VITALS — WEIGHT: 265 LBS | HEIGHT: 70 IN | BODY MASS INDEX: 37.94 KG/M2 | TEMPERATURE: 97.6 F

## 2022-02-28 VITALS
HEART RATE: 55 BPM | SYSTOLIC BLOOD PRESSURE: 145 MMHG | BODY MASS INDEX: 38.51 KG/M2 | HEIGHT: 70 IN | WEIGHT: 269 LBS | DIASTOLIC BLOOD PRESSURE: 71 MMHG | TEMPERATURE: 98.3 F

## 2022-02-28 DIAGNOSIS — M25.572 ACUTE LEFT ANKLE PAIN: Primary | ICD-10-CM

## 2022-02-28 DIAGNOSIS — S82.392A CLOSED FRACTURE OF POSTERIOR MALLEOLUS OF LEFT TIBIA, INITIAL ENCOUNTER: Primary | ICD-10-CM

## 2022-02-28 DIAGNOSIS — M25.572 ACUTE LEFT ANKLE PAIN: ICD-10-CM

## 2022-02-28 PROBLEM — R01.1 HEART MURMUR: Status: RESOLVED | Noted: 2022-02-28 | Resolved: 2022-02-28

## 2022-02-28 PROBLEM — R79.89 HIGH THYROID STIMULATING HORMONE (TSH) LEVEL: Status: RESOLVED | Noted: 2019-05-02 | Resolved: 2022-02-28

## 2022-02-28 PROBLEM — R79.89 HIGH THYROID STIMULATING HORMONE (TSH) LEVEL: Status: ACTIVE | Noted: 2019-05-02

## 2022-02-28 PROBLEM — I10 ESSENTIAL HYPERTENSION: Status: ACTIVE | Noted: 2019-02-15

## 2022-02-28 PROBLEM — R01.1 HEART MURMUR: Status: ACTIVE | Noted: 2022-02-28

## 2022-02-28 PROBLEM — F41.8 MIXED ANXIETY AND DEPRESSIVE DISORDER: Status: ACTIVE | Noted: 2019-02-15

## 2022-02-28 PROCEDURE — 99213 OFFICE O/P EST LOW 20 MIN: CPT | Performed by: FAMILY MEDICINE

## 2022-02-28 PROCEDURE — 73610 X-RAY EXAM OF ANKLE: CPT

## 2022-02-28 PROCEDURE — 99204 OFFICE O/P NEW MOD 45 MIN: CPT | Performed by: PHYSICIAN ASSISTANT

## 2022-02-28 PROCEDURE — 27808 TREATMENT OF ANKLE FRACTURE: CPT | Performed by: PHYSICIAN ASSISTANT

## 2022-02-28 RX ORDER — HYDROCODONE BITARTRATE AND ACETAMINOPHEN 5; 325 MG/1; MG/1
TABLET ORAL
Qty: 40 TABLET | Refills: 0 | Status: SHIPPED | OUTPATIENT
Start: 2022-02-28 | End: 2022-06-02

## 2022-02-28 NOTE — PROGRESS NOTES
"Chief Complaint  Ankle Pain (left)    Subjective          Inderjit Lala presents to [facility]  --TWISTED LEFT ANKLE TWO DAYS AGO.  NOW SWELLING AND PAIN.          No Known Allergies     Health Maintenance Due   Topic Date Due   • ANNUAL PHYSICAL  Never done   • TDAP/TD VACCINES (3 - Td or Tdap) 05/13/2021   • INFLUENZA VACCINE  08/01/2021   • HEPATITIS C SCREENING  Never done   • COVID-19 Vaccine (3 - Booster for Pfizer series) 08/26/2021        Current Outpatient Medications on File Prior to Visit   Medication Sig   • propranolol XL (INNOPRAN XL) 120 MG 24 hr capsule Take 1 capsule by mouth 2 (Two) Times a Day.   • sertraline (ZOLOFT) 50 MG tablet Take 1 tablet by mouth Daily. WILL NEED FOLLOW UP APPOINTMENT FOR REFILLS     No current facility-administered medications on file prior to visit.       Immunization History   Administered Date(s) Administered   • COVID-19 (PFIZER) PURPLE CAP 02/24/2021, 03/26/2021   • DT 07/12/2004   • DTP 04/12/1993, 06/09/1993, 03/30/1994, 01/17/1997   • HPV, Unspecified 05/22/2003, 05/22/2003, 07/30/2003, 07/30/2003, 12/01/2003, 12/01/2003   • Hep B, Adolescent or Pediatric 02/25/1993, 02/25/1993, 01/05/1994   • Hepatitis A 04/25/2018, 11/01/2018   • Hib (PRP-OMP) 04/12/1993, 06/09/1993, 12/25/1993, 03/30/1994   • Influenza, Unspecified 12/19/2020   • MMR 03/30/1994, 01/17/1997   • Meningococcal MCV4P (Menactra) 06/06/2008   • OPV 01/01/1993, 02/25/1993, 04/12/1993, 03/30/1994   • Td 07/12/2004   • Tdap 05/13/2011   • Varicella 01/17/1997       Review of Systems   Constitutional: Negative for chills and fever.   Gastrointestinal: Negative for nausea and vomiting.   Neurological: Positive for weakness. Negative for numbness.        Objective     /71 (BP Location: Left arm, Patient Position: Sitting)   Pulse 55   Temp 98.3 °F (36.8 °C) (Oral)   Ht 177.8 cm (70\")   Wt 122 kg (269 lb)   BMI 38.60 kg/m²       Physical Exam  Vitals and " nursing note reviewed.   Constitutional:       General: He is not in acute distress.     Appearance: Normal appearance.   Pulmonary:      Effort: Pulmonary effort is normal.   Musculoskeletal:      Right lower leg: No edema.      Left lower leg: Edema present.      Comments: LEFT ANKLE WITH LIMITED R.O.M. AND TENER/EDEMA BILATERALLY     Neurological:      General: No focal deficit present.      Mental Status: He is alert.      Cranial Nerves: No cranial nerve deficit.      Coordination: Coordination normal.      Gait: Gait normal.   Psychiatric:         Mood and Affect: Mood normal.         Behavior: Behavior normal.         Result Review :                             Assessment and Plan      Diagnoses and all orders for this visit:    1. Acute left ankle pain (Primary)  Assessment & Plan:  PER RADIOLOGY X-RAY SHOWS A POSTERIOR MALLEOLAR FRACTURE.  ORTHO WILL SEE HIM NOW.      Orders:  -     XR Ankle 3+ View Left  -     Ambulatory Referral to Orthopedic Surgery          Follow Up     Return if symptoms worsen or fail to improve.    Patient was given instructions and counseling regarding his condition or for health maintenance advice. Please see specific information pulled into the AVS if appropriate.

## 2022-02-28 NOTE — TELEPHONE ENCOUNTER
CALLED PATIENT TO LET HIM KNOW THAT HIS PRESCRIPTION HAS BEEN SENT TO THE PHARMACY  
PATIENT CALLED ASKING IF HE IS SUPPOSED TO WEAR THE CAM WALKING BOOT ALL OF THE TIME (EVEN WHEN SLEEPING).  PATIENT WAS ADVISED THAT HE SHOULD WEAR THE BOOT ALL OF THE TIME, EVEN WHILE SLEEPLING, EXCEPT FOR WHEN HE SHOWERS.  I ALSO LET HIM KNOW THAT HE SHOULD BE NON WEIGHT BEARING WITH THAT FOOT WHILE BOOT IS OFF IN THE SHOWER.  HE EXPRESSED UNDERSTANDING.    HE IS ALSO ASKING FOR SOMETHING FOR PAIN RELIEF.  HE HAS NKDA AND USES [facility] IN [place]  
Thank you for letting him know that. That is all correct. I will send a pain medication to [facility] to sign.  
denies previous

## 2022-02-28 NOTE — PROGRESS NOTES
"Chief Complaint  Pain of the Left Ankle and Establish Care    Subjective    History of Present Illness      Inderjit Lala is a 31 y.o. male who presents to [facility] for complaint of left ankle pain secondary to injury on 2/26/22. He states he was walking out of his house, down steps when he rolled the ankle. He reports immediate pain and difficulty with weight bearing. He has noticed swelling and bruising of the ankle. He is employed by [employer].         Objective   Vital Signs:   Temp 97.6 °F (36.4 °C)   Ht 177.8 cm (70\")   Wt 120 kg (265 lb)   BMI 38.02 kg/m²     Physical Exam  Vitals signs and nursing note reviewed.   Constitutional:       Appearance: Normal appearance.   Pulmonary:      Effort: Pulmonary effort is normal.   Skin:     General: Skin is warm and dry.      Capillary Refill: Capillary refill takes less than 2 seconds.   Neurological:      General: No focal deficit present.      Mental Status: He is alert and oriented to person, place, and time. Mental status is at baseline.   Psychiatric:         Mood and Affect: Mood normal.         Behavior: Behavior normal.         Thought Content: Thought content normal.         Judgment: Judgment normal.     Ortho Exam   LEFT ankle  Positive for diffuse ankle swelling and a mild amount of pedal edema on the distal tibia.    Positive for tenderness of the posterior malleolus, as well as tenderness at the medial malleolus. There is no tenderness with palpation at the lateral malleolus.  There is slight widening of the ankle mortise on xrays.  Positive for restricted dorsiflexion and plantarflexion.   External rotation and squeeze tests are negative.   Calf is soft and nontender.  Dorsalis pedis artery is palpable. Common peroneal nerve function is well preserved.  No evidence of clinical deformity or clinical signs of instability.    Negative for proximal fibular tenderness.       Result Review : "   Radiologic studies - see below for interpretation  LEFT ankle xrays  nonweightbearing 3 views were performed at [facility] on 2/28/22. Images were independently viewed and interpreted by myself, my impression as follows:  · Slight widening of the ankle mortise  · Nondisplaced fracture of the posterior malleolus        PROCEDURE  Procedures           Assessment   Assessment and Plan    Problem List Items Addressed This Visit        Musculoskeletal and Injuries    Acute left ankle pain    Relevant Orders    MRI Ankle Left Without Contrast    Closed fracture of posterior malleolus of left tibia - Primary          Follow Up   · Discussion of any imaging in detail. Discussion of orthopaedic goals.  · Risk, benefits, and merits of treatment alternatives reviewed with the patient. Treatment alternatives include: further imaging/testing and immobilization. Since there is widening of the ankle mortise I would recommend we proceed with an MRI to evaluate for ligamentous injury. Until MRI I will have him in a tall cam boot with NWB.  · Ice, heat, and/or modalities as beneficial  · To schedule MRI of left ankle  · Patient is encouraged to call or return for any issues or concerns.  · Tall leg ankle immobilizer/cam walking boot  · Follow up will be based on results of MRI  • Patient was given instructions and counseling regarding his condition or for health maintenance advice. Please see specific information pulled into the AVS if appropriate.     Ethan Swartz PA-C   Date of Encounter: 2/28/2022   Electronically signed by Ethan Swartz PA-C, 02/28/22, 10:30 AM EST.     SHERRON Dragon/Transcription disclaimer:  Much of this encounter note is an electronic transcription/translation of spoken language to printed text. The electronic translation of spoken language may permit erroneous, or at times, nonsensical words or phrases to be inadvertently transcribed; Although I have reviewed the note for  such errors, some may still exist.

## 2022-03-07 ENCOUNTER — TELEPHONE (OUTPATIENT)
Dept: ORTHOPEDIC SURGERY | Facility: CLINIC | Age: 32
End: 2022-03-07

## 2022-03-07 NOTE — TELEPHONE ENCOUNTER
Caller: SHALINI CRANDALL    Relationship: SELF    Best call back number:     Caller requesting test results: YES, MRI    What test was performed: LC    When was the test performed: 03 03 22    Where was the test performed: [facility]    Additional notes: PATIENT WAS ADVISED SOMEONE WOULD CALL HIM BACK W/I 24-48 HOURS.

## 2022-03-08 ENCOUNTER — OFFICE VISIT (OUTPATIENT)
Dept: ORTHOPEDIC SURGERY | Facility: CLINIC | Age: 32
End: 2022-03-08

## 2022-03-08 VITALS — HEIGHT: 70 IN | TEMPERATURE: 97.3 F | BODY MASS INDEX: 37.94 KG/M2 | WEIGHT: 265 LBS

## 2022-03-08 DIAGNOSIS — S82.392A CLOSED FRACTURE OF POSTERIOR MALLEOLUS OF LEFT TIBIA, INITIAL ENCOUNTER: ICD-10-CM

## 2022-03-08 PROCEDURE — 29405 APPL SHORT LEG CAST: CPT | Performed by: PHYSICIAN ASSISTANT

## 2022-03-08 PROCEDURE — 99024 POSTOP FOLLOW-UP VISIT: CPT | Performed by: PHYSICIAN ASSISTANT

## 2022-03-08 NOTE — PROGRESS NOTES
"Chief Complaint  Follow-up and Pain of the Left Ankle    Subjective    History of Present Illness      Inderjit Lala is a 31 y.o. male who presents to [facility] for follow up on left ankle. I initially saw him on 2/28/22 for an injury on 2/26/22. The injury occurred from rolling/twisting the ankle as he was walking down steps at his house. It was found that he had a fracture of the posterior malleolus and slight widening of the ankle mortise on xrays. He was placed into a cam boot and an MRI was scheduled. He returns today to discuss the MRI and to be casted.         Objective   Vital Signs:   Temp 97.3 °F (36.3 °C)   Ht 177.8 cm (70\")   Wt 120 kg (265 lb)   BMI 38.02 kg/m²     Physical Exam  Vitals signs and nursing note reviewed.   Constitutional:       Appearance: Normal appearance.   Pulmonary:      Effort: Pulmonary effort is normal.   Skin:     General: Skin is warm and dry.      Capillary Refill: Capillary refill takes less than 2 seconds.   Neurological:      General: No focal deficit present.      Mental Status: He is alert and oriented to person, place, and time. Mental status is at baseline.   Psychiatric:         Mood and Affect: Mood normal.         Behavior: Behavior normal.         Thought Content: Thought content normal.         Judgment: Judgment normal.     Ortho Exam   LEFT ankle  Positive for diffuse ankle swelling and a mild amount of pedal edema on the distal tibia.    Positive for tenderness of the posterior malleolus, as well as tenderness at the medial malleolus. There is no tenderness with palpation at the lateral malleolus.  There is slight widening of the ankle mortise on xrays.  Positive for restricted dorsiflexion and plantarflexion.   External rotation and squeeze tests are negative.   Calf is soft and nontender.  Dorsalis pedis artery is palpable. Common peroneal nerve function is well preserved.  No evidence of clinical deformity or clinical " signs of instability.    Negative for proximal fibular tenderness.       Result Review :   Radiologic studies - see below for interpretation  Reviewed MRI report of left ankle, performed at  [facility] on 3/3/22, summary of impression below:  · Nondisplaced vertical coronal plane fracture through the posterior malleolus  · No disruption of the articular surface  · Ankle effusion and small subtalar effusion  · Suspected sprain of spring ligament and probable low grade sprain of the deltoid ligament complex        PROCEDURE  Procedures           Assessment   Assessment and Plan    Problem List Items Addressed This Visit        Musculoskeletal and Injuries    Closed fracture of posterior malleolus of left tibia          Follow Up   · Discussion of any imaging in detail. Discussion of orthopaedic goals.  · At this point we know the ankle is stable and there is no disruption of the ankle mortise and syndesmosis. I recommend proceeding with a cast.  · A tall fiberglass leg cast was applied in office  · Ice, heat, and/or modalities as beneficial  · Patient is encouraged to call or return for any issues or concerns.   · Follow up in 3 weeks   • Patient was given instructions and counseling regarding his condition or for health maintenance advice. Please see specific information pulled into the AVS if appropriate.     Ethan Swartz PA-C   Date of Encounter: 3/8/2022   Electronically signed by Ethan Swartz PA-C, 03/20/22, 6:17 PM EDT.     EMR Dragon/Transcription disclaimer:  Much of this encounter note is an electronic transcription/translation of spoken language to printed text. The electronic translation of spoken language may permit erroneous, or at times, nonsensical words or phrases to be inadvertently transcribed; Although I have reviewed the note for such errors, some may still exist.

## 2022-03-21 NOTE — PROGRESS NOTES
He has 3 steps between the house and garage/play area. He fell down those! He's doing okay....cruising around on his leg scooter. mv

## 2022-03-28 ENCOUNTER — HOSPITAL ENCOUNTER (OUTPATIENT)
Dept: GENERAL RADIOLOGY | Facility: HOSPITAL | Age: 32
Discharge: HOME | End: 2022-03-28
Admitting: PHYSICIAN ASSISTANT

## 2022-03-28 ENCOUNTER — TELEPHONE (OUTPATIENT)
Dept: ORTHOPEDIC SURGERY | Facility: CLINIC | Age: 32
End: 2022-03-28

## 2022-03-28 ENCOUNTER — OFFICE VISIT (OUTPATIENT)
Dept: ORTHOPEDIC SURGERY | Facility: CLINIC | Age: 32
End: 2022-03-28

## 2022-03-28 DIAGNOSIS — S82.392D CLOSED FRACTURE OF POSTERIOR MALLEOLUS OF LEFT TIBIA WITH ROUTINE HEALING, SUBSEQUENT ENCOUNTER: Primary | ICD-10-CM

## 2022-03-28 DIAGNOSIS — S82.392A CLOSED FRACTURE OF POSTERIOR MALLEOLUS OF LEFT TIBIA, INITIAL ENCOUNTER: ICD-10-CM

## 2022-03-28 PROCEDURE — 73610 X-RAY EXAM OF ANKLE: CPT

## 2022-03-28 PROCEDURE — 99024 POSTOP FOLLOW-UP VISIT: CPT | Performed by: PHYSICIAN ASSISTANT

## 2022-03-28 NOTE — TELEPHONE ENCOUNTER
Caller: PATIENT     Relationship to patient: SELF     Best call back number: [phone]     Chief complaint: FOLLOW UP ANKLE FRACTURE CARE     Type of visit: FRACTURE CARE    Requested date: TODAY OR TOMORROW     If rescheduling, when is the original appointment: WED- 3/30/22     Additional notes: PT. HAS APPT. ON WED.- HE IS ASKING IF POSSIBLE CAN HE MOVE APPT. UP TO TODAY OR TOMORROW.

## 2022-03-28 NOTE — PROGRESS NOTES
Chief Complaint  Fracture of the Left Ankle and Cast Removal    Subjective    History of Present Illness      Inderjit Lala is a 31 y.o. male who presents to [facility] for follow up on left ankle. I initially saw him on 2/28/22 for an injury on 2/26/22. The injury occurred from rolling/twisting the ankle as he was walking down steps at his house. He is being treated for a posterior malleolus fracture of the left ankle. He reports improvement in his pain but increased stiffness from being in the cast. Cast was removed in office.         Objective   Vital Signs:   There were no vitals taken for this visit.    Physical Exam  Vitals signs and nursing note reviewed.   Constitutional:       Appearance: Normal appearance.   Pulmonary:      Effort: Pulmonary effort is normal.   Skin:     General: Skin is warm and dry.      Capillary Refill: Capillary refill takes less than 2 seconds.   Neurological:      General: No focal deficit present.      Mental Status: He is alert and oriented to person, place, and time. Mental status is at baseline.   Psychiatric:         Mood and Affect: Mood normal.         Behavior: Behavior normal.         Thought Content: Thought content normal.         Judgment: Judgment normal.     Ortho Exam   LEFT ankle  Improved ankle swelling and a minimal amount of pedal edema on the distal tibia.    Negative for tenderness of the posterior malleolus and medial malleolus.   There is no tenderness with palpation at the lateral malleolus.  Positive for restricted dorsiflexion and plantarflexion due to immobilization.   External rotation and squeeze tests are negative.   Calf is soft and nontender.  Dorsalis pedis artery is palpable. Common peroneal nerve function is well preserved.  No evidence of clinical deformity or clinical signs of instability.    Negative for proximal fibular tenderness.       Result Review :   PRIOR IMAGING  Radiologic studies - see below for  interpretation  Reviewed MRI report of left ankle, performed at  [facility] on 3/3/22, summary of impression below:  · Nondisplaced vertical coronal plane fracture through the posterior malleolus  · No disruption of the articular surface  · Ankle effusion and small subtalar effusion  · Suspected sprain of spring ligament and probable low grade sprain of the deltoid ligament complex        PROCEDURE  Procedures           Assessment   Assessment and Plan    Problem List Items Addressed This Visit        Musculoskeletal and Injuries    Closed fracture of posterior malleolus of left tibia - Primary    Relevant Orders    XR Ankle 3+ View Left (Completed)          Follow Up   · Discussion of any imaging in detail. Discussion of orthopaedic goals.  · Cast removed and patient will go back into tall cam walking boot. He has been instructed to do limited WB but I have advised in the last week leading to his next appointment he can attempt to increase his WB to partial but only with boot on.   · Ice, heat, and/or modalities as beneficial  · Patient is encouraged to call or return for any issues or concerns.   · Follow up in 4 weeks   • Patient was given instructions and counseling regarding his condition or for health maintenance advice. Please see specific information pulled into the AVS if appropriate.     Ethan Swartz PA-C   Date of Encounter: 3/28/2022   Electronically signed by Ethan Swartz PA-C, 03/28/22, 3:20 PM EDT.     EMR Dragon/Transcription disclaimer:  Much of this encounter note is an electronic transcription/translation of spoken language to printed text. The electronic translation of spoken language may permit erroneous, or at times, nonsensical words or phrases to be inadvertently transcribed; Although I have reviewed the note for such errors, some may still exist.

## 2022-04-19 DIAGNOSIS — S82.392D CLOSED FRACTURE OF POSTERIOR MALLEOLUS OF LEFT TIBIA WITH ROUTINE HEALING, SUBSEQUENT ENCOUNTER: Primary | ICD-10-CM

## 2022-04-22 NOTE — PROGRESS NOTES
Chief Complaint  Follow-up of the Left Ankle    Subjective    History of Present Illness      Inderjit Lala is a 31 y.o. male who presents to [facility] for 8 week follow up on left ankle. I initially saw him on 2/28/22 for an injury on 2/26/22. The injury occurred from rolling/twisting the ankle as he was walking down steps at his house. He is being treated for a posterior malleolus fracture of the left ankle. He has been in a cam boot for the last 4 weeks. He reports no pain. He states he has been out of the boot for 2 weeks, has been turkey hunting and doing other things on uneven ground without pain or problem. He reports he has been wearing an ankle brace which helps to provide stability. He would like to return to work.      Objective   Vital Signs:   There were no vitals taken for this visit.    Physical Exam  Vitals signs and nursing note reviewed.   Constitutional:       Appearance: Normal appearance.   Pulmonary:      Effort: Pulmonary effort is normal.   Skin:     General: Skin is warm and dry.      Capillary Refill: Capillary refill takes less than 2 seconds.   Neurological:      General: No focal deficit present.      Mental Status: He is alert and oriented to person, place, and time. Mental status is at baseline.   Psychiatric:         Mood and Affect: Mood normal.         Behavior: Behavior normal.         Thought Content: Thought content normal.         Judgment: Judgment normal.     Ortho Exam   LEFT ankle  Ankle swelling has resolved.  Negative for tenderness of the posterior malleolus and medial malleolus.   There is no tenderness with palpation at the lateral malleolus.  Improved dorsiflexion and plantarflexion.  External rotation and squeeze tests are negative.   Calf is soft and nontender.  Dorsalis pedis artery is palpable. Common peroneal nerve function is well preserved.  No evidence of clinical deformity or clinical signs of instability.    Negative for  proximal fibular tenderness.       Result Review :   LEFT ankle xrays  nonweightbearing 3 views were ordered by Ethan Swartz PA-C. Performed at [facility] on 4/25/2022. Images were independently viewed and interpreted by myself, my impression as follows:   Findings: good healing of the posterior malleolus fracture, fracture line faintly visible  Bony lesion: no  Soft tissues: within normal limits  Joint spaces: within normal limits  Hardware appropriately positioned: not applicable  Prior studies available for comparison: yes      PRIOR IMAGING  Radiologic studies - see below for interpretation  Reviewed MRI report of left ankle, performed at  [facility] on 3/3/22, summary of impression below:  · Nondisplaced vertical coronal plane fracture through the posterior malleolus  · No disruption of the articular surface  · Ankle effusion and small subtalar effusion  · Suspected sprain of spring ligament and probable low grade sprain of the deltoid ligament complex        PROCEDURE  Procedures           Assessment   Assessment and Plan    Problem List Items Addressed This Visit        Musculoskeletal and Injuries    Closed fracture of posterior malleolus of left tibia - Primary          Follow Up   · Discussion of any imaging in detail. Discussion of orthopaedic goals.  · Per patient request, I will release him back to work without restrictions although I have discussed in detail the risks associated with this. We discussed that he is still at risk for re-injury/fracture of the ankle over the next 4 weeks. He should continue use of the ankle brace.   · Ice, heat, and/or modalities as beneficial  · Patient is encouraged to call or return for any issues or concerns.   · Follow up in 4 weeks with xrays  • Patient was given instructions and counseling regarding his condition or for health maintenance advice. Please see specific information pulled into the AVS if appropriate.     Ethan Saldivar  TK Swartz   Date of Encounter: 4/25/2022     Electronically signed by Ethan Swartz PA-C, 04/25/22, 10:52 AM EDT.   EMR Dragon/Transcription disclaimer:  Much of this encounter note is an electronic transcription/translation of spoken language to printed text. The electronic translation of spoken language may permit erroneous, or at times, nonsensical words or phrases to be inadvertently transcribed; Although I have reviewed the note for such errors, some may still exist.

## 2022-04-25 ENCOUNTER — HOSPITAL ENCOUNTER (OUTPATIENT)
Dept: GENERAL RADIOLOGY | Facility: HOSPITAL | Age: 32
Discharge: HOME | End: 2022-04-25
Admitting: PHYSICIAN ASSISTANT

## 2022-04-25 ENCOUNTER — OFFICE VISIT (OUTPATIENT)
Dept: ORTHOPEDIC SURGERY | Facility: CLINIC | Age: 32
End: 2022-04-25

## 2022-04-25 DIAGNOSIS — S82.392D CLOSED FRACTURE OF POSTERIOR MALLEOLUS OF LEFT TIBIA WITH ROUTINE HEALING, SUBSEQUENT ENCOUNTER: ICD-10-CM

## 2022-04-25 DIAGNOSIS — S82.392D CLOSED FRACTURE OF POSTERIOR MALLEOLUS OF LEFT TIBIA WITH ROUTINE HEALING, SUBSEQUENT ENCOUNTER: Primary | ICD-10-CM

## 2022-04-25 PROCEDURE — 73610 X-RAY EXAM OF ANKLE: CPT

## 2022-04-25 PROCEDURE — 99024 POSTOP FOLLOW-UP VISIT: CPT | Performed by: PHYSICIAN ASSISTANT

## 2022-05-17 DIAGNOSIS — S82.392D CLOSED FRACTURE OF POSTERIOR MALLEOLUS OF LEFT TIBIA WITH ROUTINE HEALING, SUBSEQUENT ENCOUNTER: Primary | ICD-10-CM

## 2022-06-02 ENCOUNTER — OFFICE VISIT (OUTPATIENT)
Dept: FAMILY MEDICINE CLINIC | Age: 32
End: 2022-06-02

## 2022-06-02 VITALS
SYSTOLIC BLOOD PRESSURE: 136 MMHG | WEIGHT: 277.8 LBS | BODY MASS INDEX: 39.77 KG/M2 | HEART RATE: 70 BPM | DIASTOLIC BLOOD PRESSURE: 76 MMHG | TEMPERATURE: 98.5 F | HEIGHT: 70 IN

## 2022-06-02 DIAGNOSIS — R03.0 ELEVATED BLOOD PRESSURE READING: ICD-10-CM

## 2022-06-02 DIAGNOSIS — F41.8 MIXED ANXIETY AND DEPRESSIVE DISORDER: ICD-10-CM

## 2022-06-02 DIAGNOSIS — H66.003 NON-RECURRENT ACUTE SUPPURATIVE OTITIS MEDIA OF BOTH EARS WITHOUT SPONTANEOUS RUPTURE OF TYMPANIC MEMBRANES: Primary | ICD-10-CM

## 2022-06-02 PROCEDURE — 99213 OFFICE O/P EST LOW 20 MIN: CPT | Performed by: NURSE PRACTITIONER

## 2022-06-02 RX ORDER — CEFDINIR 300 MG/1
300 CAPSULE ORAL 2 TIMES DAILY
Qty: 20 CAPSULE | Refills: 0 | Status: SHIPPED | OUTPATIENT
Start: 2022-06-02 | End: 2022-06-12

## 2022-06-02 NOTE — PROGRESS NOTES
"Inderjit Lala presents to [facility] with complaint of  Earache (Right)    SUBJECTIVE  Earache   There is pain in the right ear. This is a new (seen at  5/23-given amoxicillin and cipro ear gtts, no relief ) problem. The problem occurs constantly. The problem has been gradually worsening. There has been no fever. Associated symptoms include coughing, headaches, neck pain and rhinorrhea. Pertinent negatives include no diarrhea, ear discharge, sore throat or vomiting. He has tried antibiotics and ear drops for the symptoms. The treatment provided no relief. His past medical history is significant for a chronic ear infection and a tympanostomy tube.      He is also requesting refills for Zoloft.     OBJECTIVE  Vital Signs:   /76 (BP Location: Left arm, Patient Position: Sitting)   Pulse 70   Temp 98.5 °F (36.9 °C) (Oral)   Ht 177.8 cm (70\")   Wt 126 kg (277 lb 12.8 oz)   BMI 39.86 kg/m²       Physical Exam  Constitutional:       General: He is not in acute distress.     Appearance: Normal appearance. He is not ill-appearing.   HENT:      Head: Normocephalic and atraumatic.      Right Ear: Ear canal normal. Tenderness present. A middle ear effusion is present. Tympanic membrane is erythematous and bulging.      Left Ear: Ear canal normal. Tenderness present. A middle ear effusion is present. Tympanic membrane is injected and scarred. Tympanic membrane is not erythematous or bulging.      Nose: Nose normal.      Mouth/Throat:      Mouth: Mucous membranes are moist.      Pharynx: Oropharynx is clear.   Cardiovascular:      Rate and Rhythm: Normal rate and regular rhythm.      Pulses: Normal pulses.      Heart sounds: Normal heart sounds.   Pulmonary:      Effort: Pulmonary effort is normal. No respiratory distress.      Breath sounds: Normal breath sounds.   Chest:      Chest wall: No tenderness.   Musculoskeletal:         General: Normal range of motion.      Cervical " back: Normal range of motion and neck supple.   Lymphadenopathy:      Cervical: No cervical adenopathy.   Skin:     General: Skin is warm and dry.      Capillary Refill: Capillary refill takes less than 2 seconds.   Neurological:      General: No focal deficit present.      Mental Status: He is alert and oriented to person, place, and time. Mental status is at baseline.   Psychiatric:         Mood and Affect: Mood normal.         Behavior: Behavior normal.            ASSESSMENT AND PLAN:  Diagnoses and all orders for this visit:    1. Non-recurrent acute suppurative otitis media of both ears without spontaneous rupture of tympanic membranes (Primary)  Comments:  -return if no improvment in next 4-5 days  -tylenol for pain   Orders:  -     neomycin-polymyxin-hydrocortisone (CORTISPORIN) 3.5-21489-0 otic solution; Administer 3 drops into both ears 4 (Four) Times a Day for 7 days.  Dispense: 10 mL; Refill: 0  -     cefdinir (OMNICEF) 300 MG capsule; Take 1 capsule by mouth 2 (Two) Times a Day for 10 days.  Dispense: 20 capsule; Refill: 0    2. Mixed anxiety and depressive disorder  Comments:  -well controlled with zoloft   -30 day supply given today   -follow up with PCP for longer med supply   Orders:  -     sertraline (ZOLOFT) 50 MG tablet; Take 1 tablet by mouth Daily. WILL NEED FOLLOW UP APPOINTMENT FOR REFILLS  Dispense: 30 tablet; Refill: 0    3. Elevated blood pressure reading  Comments:  -could be r/t ear pain he is having  -reassess at follow up with PCP in 1 month         Follow Up   Return in about 1 month (around 7/2/2022). Patient to notify office with any acute concerns or issues.  Patient verbalizes understanding, agrees with plan of care and has no further questions upon discharge.     Patient was given instructions and counseling regarding his condition or for health maintenance advice. Please see specific information pulled into the AVS if appropriate.       EMR Dragon/Transcription disclaimer:  Much  of this encounter note is an electronic transcription/translation of spoken language to printed text. The electronic translation of spoken language may permit erroneous, or at times, nonsensical words or phrases to be inadvertently transcribed; Although I have reviewed the note for such errors, some may still exist.

## 2022-07-14 ENCOUNTER — TELEPHONE (OUTPATIENT)
Dept: FAMILY MEDICINE CLINIC | Age: 32
End: 2022-07-14

## 2022-07-19 ENCOUNTER — OFFICE VISIT (OUTPATIENT)
Dept: FAMILY MEDICINE CLINIC | Age: 32
End: 2022-07-19

## 2022-07-19 VITALS
DIASTOLIC BLOOD PRESSURE: 65 MMHG | HEART RATE: 56 BPM | BODY MASS INDEX: 39.86 KG/M2 | SYSTOLIC BLOOD PRESSURE: 135 MMHG | WEIGHT: 277.8 LBS

## 2022-07-19 DIAGNOSIS — F41.8 MIXED ANXIETY AND DEPRESSIVE DISORDER: Primary | ICD-10-CM

## 2022-07-19 DIAGNOSIS — H60.501 ACUTE OTITIS EXTERNA OF RIGHT EAR, UNSPECIFIED TYPE: ICD-10-CM

## 2022-07-19 DIAGNOSIS — I10 ESSENTIAL HYPERTENSION: ICD-10-CM

## 2022-07-19 PROCEDURE — 99213 OFFICE O/P EST LOW 20 MIN: CPT | Performed by: NURSE PRACTITIONER

## 2022-07-19 NOTE — PROGRESS NOTES
Inderjit Lala presents to [facility].    Chief Complaint:  Anxiety (Medication refill )         History of Present Illness:  Anxiety/ Depression  Current medication/zoloft  Tolerating medication Yes  Current symptoms: none, working;  needs refills to his mail order pharmacy     PAST MEDICAL HISTORY changes since 3-2020      covid +   Fractured Left tibia / ankle 2-2022    Chicken pox     Hypertension/ Aortic Regurgitation / on rx, followed by cardiology : (Dr Howard )    Hx epididymitis         CURRENT MEDICAL PROVIDERS:    Cardiologist: Dr Dwayne Barry    Dermatologist: sarah silvestre    Urologist: [facility]         PREVENTIVE HEALTH MAINTENANCE             INFLUENZA VACCINE: declines, understands reason for immunization         Surgical History:         Pressure Equalization Tubes: 1994 and 1998;     Arthroscopy: left knee; 2009;     Other Surgeries:    Vasectomy: 5-2019;     wisdom teeth extractions;         Family History:     Father: Arrhythmia ( Afib ); Congestive Heart Failure; Hypertension; Hyperlipidemia;  Type 2 Diabetes     Mother: Hypertension;  Anxiety; Depression     Brother(s): 2 brother(s) total;  Hypertension     Sister(s): 3 sister(s) total;  Hypertension     Paternal Grandmother: Type 2 Diabetes     Maternal Grandmother: Congestive Heart Failure         Social History:     Occupation: [employer];     Marital Status:      Children: 2 children             Review of Systems:  Review of Systems   Constitutional: Negative for fatigue and fever.   HENT: Positive for ear pain (intermittently pain on right side ear, has a pool, improved, not cleared).    Respiratory: Negative for cough and shortness of breath.    Cardiovascular: Negative for chest pain, palpitations and leg swelling.   Neurological: Negative for numbness.        Still loss of taste / smell since covid            Current Outpatient Medications:   •  sertraline (ZOLOFT) 50 MG  tablet, Take 1 tablet by mouth Daily. WILL NEED FOLLOW UP APPOINTMENT FOR REFILLS, Disp: 90 tablet, Rfl: 3  •  propranolol XL (INNOPRAN XL) 120 MG 24 hr capsule, Take 1 capsule by mouth 2 (Two) Times a Day., Disp: , Rfl:     Vital Signs:   Vitals:    07/19/22 1520 07/19/22 1536   BP: 151/80 135/65   BP Location: Right arm Right arm   Patient Position: Sitting Sitting   Pulse: 56 56   Weight: 126 kg (277 lb 12.8 oz)          Physical Exam:  Physical Exam  Vitals reviewed.   Constitutional:       General: He is not in acute distress.     Appearance: Normal appearance.   HENT:      Left Ear: Tympanic membrane normal.      Ears:      Comments: R TM, superior portion, mild injection, mid lower canal erythematous, no exudate   Neck:      Vascular: No carotid bruit.   Cardiovascular:      Rate and Rhythm: Normal rate and regular rhythm.      Heart sounds: Normal heart sounds. No murmur heard.  Pulmonary:      Effort: Pulmonary effort is normal. No respiratory distress.      Breath sounds: Normal breath sounds.   Musculoskeletal:      Right lower leg: No edema.      Left lower leg: No edema.   Neurological:      Mental Status: He is alert.   Psychiatric:         Mood and Affect: Mood normal.         Behavior: Behavior normal.         Result Review      The following data was reviewed by: FANY Thakkar on 07/19/2022:    No results found for this or any previous visit.            Assessment and Plan:          Diagnoses and all orders for this visit:    1. Mixed anxiety and depressive disorder (Primary)  Comments:  -well controlled with zoloft   -30 day supply given today   -follow up with PCP for longer med supply   Orders:  -     sertraline (ZOLOFT) 50 MG tablet; Take 1 tablet by mouth Daily. WILL NEED FOLLOW UP APPOINTMENT FOR REFILLS  Dispense: 90 tablet; Refill: 3    2. Acute otitis externa of right ear, unspecified type  Assessment & Plan:  Restart  cortisporin otic 5 more days, keep ears dry, consider ENT  consult       3. Essential hypertension  Assessment & Plan:   to monitor BP at home. Continue current meds. Continue to modify diet and lifestyle. Follow up with cardiology as directed           Follow Up   Return if symptoms worsen or fail to improve.  Patient was given instructions and counseling regarding his condition or for health maintenance advice. Please see specific information pulled into the AVS if appropriate.

## 2022-07-19 NOTE — ASSESSMENT & PLAN NOTE
to monitor BP at home. Continue current meds. Continue to modify diet and lifestyle. Follow up with cardiology as directed

## 2022-09-12 ENCOUNTER — TELEPHONE (OUTPATIENT)
Dept: FAMILY MEDICINE CLINIC | Age: 32
End: 2022-09-12

## 2022-09-12 NOTE — TELEPHONE ENCOUNTER
Caller: Inderjit Lala    Relationship to patient: Self    Best call back number: [phone]    New or established patient?  [] New  [x] Established    Date of discharge: 09/08/2022    Facility discharged from: [facility]    Diagnosis/Symptoms: N/A    Length of stay (If applicable): ONE DAY AT ER DID XRAYS OF BOTH ANKLES CT SCAN ON BACK    Specialty Only: Did you see a [facility] health provider?    [] Yes  [x] No  If so, who?[facility] 09/08/2022     HOSPITAL FOLLOW UP IS SCHEDULED FOR TODAY AT  2:30 WITH MARKO PATHAK. PCP WAS NOT AVAILABLE. [facility] AND WANTED TO BE SEEN TODAY.

## 2023-10-05 DIAGNOSIS — F41.8 MIXED ANXIETY AND DEPRESSIVE DISORDER: ICD-10-CM

## 2023-12-21 DIAGNOSIS — F41.8 MIXED ANXIETY AND DEPRESSIVE DISORDER: ICD-10-CM

## 2024-01-16 ENCOUNTER — OFFICE VISIT (OUTPATIENT)
Dept: FAMILY MEDICINE CLINIC | Age: 34
End: 2024-01-16
Payer: COMMERCIAL

## 2024-01-16 VITALS
HEART RATE: 56 BPM | BODY MASS INDEX: 42.83 KG/M2 | WEIGHT: 299.2 LBS | HEIGHT: 70 IN | DIASTOLIC BLOOD PRESSURE: 91 MMHG | SYSTOLIC BLOOD PRESSURE: 165 MMHG

## 2024-01-16 DIAGNOSIS — I10 ESSENTIAL HYPERTENSION: Primary | ICD-10-CM

## 2024-01-16 DIAGNOSIS — F41.8 MIXED ANXIETY AND DEPRESSIVE DISORDER: ICD-10-CM

## 2024-01-16 DIAGNOSIS — R53.83 OTHER FATIGUE: ICD-10-CM

## 2024-01-16 DIAGNOSIS — Z11.59 SCREENING FOR VIRAL DISEASE: ICD-10-CM

## 2024-01-16 PROCEDURE — 99214 OFFICE O/P EST MOD 30 MIN: CPT | Performed by: NURSE PRACTITIONER

## 2024-01-16 RX ORDER — MULTIPLE VITAMINS W/ MINERALS TAB 9MG-400MCG
1 TAB ORAL DAILY
COMMUNITY

## 2024-01-16 NOTE — ASSESSMENT & PLAN NOTE
Repeat BP up, follow up with cardiology. Continue to monitor BP at home. Continue current med. Continue to modify diet and lifestyle. He will return fasting for labs.  Reviewed last cardiology note.

## 2024-01-16 NOTE — PROGRESS NOTES
"Chief Complaint  Hypertension (Follow up)    Subjective          Inderjit Lala presents to [facility]    History of Present Illness  Hypertension:  Current medication: propranolol   Tolerating Medication: Yes  Checking BP at home and it is: 135-140 over 70-80's   Needs refills: no, sees Yasirneelimascott but has not been back, needs to schedule a follow up   Labs:  No results found for: \"GLUCOSE\", \"BUN\", \"CREATININE\", \"EGFRIFNONA\", \"EGFRIFAFRI\", \"BCR\", \"K\", \"CO2\", \"CALCIUM\", \"PROTENTOTREF\", \"ALBUMIN\", \"LABIL2\", \"BILIRUBIN\", \"AST\", \"ALT\"    Anxiety/ Depression  Current medication/zoloft   Tolerating medication Yes  Rx works well   May need some in the near future, uses a mail order pharmacy, not sure which one    History of sleep apnea and wears his c pap always     PAST MEDICAL HISTORY changes since :      Aortic and mitral regurg   covid +   Fractured Left tibia / ankle     Chicken pox     Hypertension/ Aortic Regurgitation / on rx, followed by cardiology : (Dr Howard )    Hx epididymitis         CURRENT MEDICAL PROVIDERS:    Cardiologist: in the past saw Dr Dwayne Barry    Dermatologist: sarah silvestre    Urologist: [facility]           Surgical History:         Pressure Equalization Tubes:  and ;     Arthroscopy: left knee; ;     Other Surgeries:    Vasectomy: ;     wisdom teeth extractions;         Family History:     Father: Arrhythmia ( Afib ); Congestive Heart Failure; Hypertension; Hyperlipidemia;  Type 2 Diabetes     Mother: Hypertension;  Anxiety; Depression     Brother(s): 2 brother(s) total;  Hypertension; 1  renal failure 47     Sister(s): 3 sister(s) total;  Hypertension (jojo vittitow)     Paternal Grandmother: Type 2 Diabetes     Maternal Grandmother: Congestive Heart Failure         Social History:     Occupation: [employer];     Marital Status:      Children: 2 children                  Past Medical History: "   Diagnosis Date    Anxiety     Aortic regurgitation     Aortic valve disorder     Aortic valve insufficiency     Chicken pox     COVID-19     Epididymitis     Hypertension        No Known Allergies     Past Surgical History:   Procedure Laterality Date    EAR TUBES      pressure equalization tubes 1994 and 1998    KNEE ARTHROSCOPY Left 2009    VASECTOMY  05/2019    WISDOM TOOTH EXTRACTION          Social History     Tobacco Use    Smoking status: Never    Smokeless tobacco: Never   Substance Use Topics    Alcohol use: Yes     Comment: rare       Family History   Problem Relation Age of Onset    Hypertension Mother     Anxiety disorder Mother     Depression Mother     Arrhythmia Father         afib    Heart failure Father     Hypertension Father     Hyperlipidemia Father     Diabetes type II Father     Hypertension Sister     Hypertension Brother     Heart failure Maternal Grandmother     Diabetes type II Paternal Grandmother         Health Maintenance Due   Topic Date Due    TDAP/TD VACCINES (3 - Td or Tdap) 05/13/2021    ANNUAL PHYSICAL  Never done    INFLUENZA VACCINE  08/01/2023    COVID-19 Vaccine (3 - 2023-24 season) 09/01/2023        Current Outpatient Medications on File Prior to Visit   Medication Sig    multivitamin with minerals tablet tablet Take 1 tablet by mouth Daily.    propranolol XL (INNOPRAN XL) 120 MG 24 hr capsule Take 1 capsule by mouth 2 (Two) Times a Day.    sertraline (ZOLOFT) 50 MG tablet TAKE 1 TABLET DAILY     No current facility-administered medications on file prior to visit.       Immunization History   Administered Date(s) Administered    COVID-19 (PFIZER) Purple Cap Monovalent 02/24/2021, 03/26/2021    DT 07/12/2004    DTP 04/12/1993, 06/09/1993, 03/30/1994, 01/17/1997    HPV, Unspecified 05/22/2003, 05/22/2003, 07/30/2003, 07/30/2003, 12/01/2003, 12/01/2003    Hep B, Adolescent or Pediatric 02/25/1993, 02/25/1993, 01/05/1994    Hepatitis A 04/25/2018, 11/01/2018    Hib (PRP-OMP)  "04/12/1993, 06/09/1993, 12/25/1993, 03/30/1994    Influenza LAIV (Nasal) 10/19/2012, 10/31/2013    Influenza, Unspecified 12/19/2020    MMR 03/30/1994, 01/17/1997    Meningococcal MCV4P (Menactra) 06/06/2008    OPV 01/01/1993, 02/25/1993, 04/12/1993, 03/30/1994    Td (TDVAX) 07/12/2004    Tdap 05/13/2011    Varicella 01/17/1997       Review of Systems   Constitutional:  Positive for fatigue. Negative for fever.   Respiratory:  Negative for cough and shortness of breath.    Cardiovascular:  Negative for chest pain, palpitations and leg swelling.   Neurological:  Negative for numbness.        Objective     Vitals:    01/16/24 1455 01/16/24 1542   BP: 150/91 165/91   BP Location: Left arm Left arm   Patient Position: Sitting Sitting   Cuff Size:  Large Adult   Pulse: 73 56   Weight: 136 kg (299 lb 3.2 oz)    Height: 177.8 cm (70\")             Physical Exam  Vitals reviewed.   Constitutional:       General: He is not in acute distress.     Appearance: Normal appearance.   Cardiovascular:      Rate and Rhythm: Normal rate and regular rhythm.      Heart sounds: Normal heart sounds.   Pulmonary:      Effort: Pulmonary effort is normal. No respiratory distress.      Breath sounds: Normal breath sounds.   Musculoskeletal:      Cervical back: Neck supple.      Right lower leg: No edema.      Left lower leg: No edema.   Neurological:      Mental Status: He is alert.   Psychiatric:         Mood and Affect: Mood normal.         Behavior: Behavior normal.         Result Review :     The following data was reviewed by: FANY Thakkar on 01/16/2024:                       Assessment and Plan      Diagnoses and all orders for this visit:    1. Essential hypertension (Primary)  Assessment & Plan:  Repeat BP up, follow up with cardiology. Continue to monitor BP at home. Continue current med. Continue to modify diet and lifestyle. He will return fasting for labs.  Reviewed last cardiology note.      Orders:  -     " Comprehensive Metabolic Panel; Future  -     Lipid Panel; Future    2. Mixed anxiety and depressive disorder  Assessment & Plan:  continue zoloft, let me know where refill needs to go        3. Other fatigue  Assessment & Plan:  Checking some labs     Orders:  -     CBC w AUTO Differential; Future  -     TSH Rfx On Abnormal To Free T4; Future    4. Screening for viral disease  Assessment & Plan:  Screen for hep C     Orders:  -     Hepatitis C antibody; Future        Class 3 Severe Obesity (BMI >=40). Obesity-related health conditions include the following: hypertension. Obesity is unchanged. BMI is is above average; BMI management plan is completed. We discussed portion control and increasing exercise.           Follow Up     Return for fasting for labs.    Patient was given instructions and counseling regarding his condition or for health maintenance advice. Please see specific information pulled into the AVS if appropriate.

## 2024-01-17 ENCOUNTER — LAB (OUTPATIENT)
Dept: LAB | Facility: HOSPITAL | Age: 34
End: 2024-01-17
Payer: COMMERCIAL

## 2024-01-17 DIAGNOSIS — R53.83 OTHER FATIGUE: ICD-10-CM

## 2024-01-17 DIAGNOSIS — Z11.59 SCREENING FOR VIRAL DISEASE: ICD-10-CM

## 2024-01-17 DIAGNOSIS — I10 ESSENTIAL HYPERTENSION: ICD-10-CM

## 2024-01-17 LAB
ALBUMIN SERPL-MCNC: 4.7 G/DL (ref 3.5–5.2)
ALBUMIN/GLOB SERPL: 1.9 G/DL
ALP SERPL-CCNC: 59 U/L (ref 39–117)
ALT SERPL W P-5'-P-CCNC: 99 U/L (ref 1–41)
ANION GAP SERPL CALCULATED.3IONS-SCNC: 10.4 MMOL/L (ref 5–15)
AST SERPL-CCNC: 45 U/L (ref 1–40)
BASOPHILS # BLD AUTO: 0.07 10*3/MM3 (ref 0–0.2)
BASOPHILS NFR BLD AUTO: 1 % (ref 0–1.5)
BILIRUB SERPL-MCNC: 0.9 MG/DL (ref 0–1.2)
BUN SERPL-MCNC: 20 MG/DL (ref 6–20)
BUN/CREAT SERPL: 18 (ref 7–25)
CALCIUM SPEC-SCNC: 10 MG/DL (ref 8.6–10.5)
CHLORIDE SERPL-SCNC: 103 MMOL/L (ref 98–107)
CHOLEST SERPL-MCNC: 136 MG/DL (ref 0–200)
CO2 SERPL-SCNC: 28.6 MMOL/L (ref 22–29)
CREAT SERPL-MCNC: 1.11 MG/DL (ref 0.76–1.27)
DEPRECATED RDW RBC AUTO: 41.3 FL (ref 37–54)
EGFRCR SERPLBLD CKD-EPI 2021: 91 ML/MIN/1.73
EOSINOPHIL # BLD AUTO: 0.14 10*3/MM3 (ref 0–0.4)
EOSINOPHIL NFR BLD AUTO: 2.1 % (ref 0.3–6.2)
ERYTHROCYTE [DISTWIDTH] IN BLOOD BY AUTOMATED COUNT: 12.7 % (ref 12.3–15.4)
GLOBULIN UR ELPH-MCNC: 2.5 GM/DL
GLUCOSE SERPL-MCNC: 95 MG/DL (ref 65–99)
HCT VFR BLD AUTO: 48.5 % (ref 37.5–51)
HCV AB SER DONR QL: NORMAL
HDLC SERPL-MCNC: 41 MG/DL (ref 40–60)
HGB BLD-MCNC: 16.6 G/DL (ref 13–17.7)
IMM GRANULOCYTES # BLD AUTO: 0.02 10*3/MM3 (ref 0–0.05)
IMM GRANULOCYTES NFR BLD AUTO: 0.3 % (ref 0–0.5)
LDLC SERPL CALC-MCNC: 82 MG/DL (ref 0–100)
LDLC/HDLC SERPL: 2.02 {RATIO}
LYMPHOCYTES # BLD AUTO: 1.96 10*3/MM3 (ref 0.7–3.1)
LYMPHOCYTES NFR BLD AUTO: 28.8 % (ref 19.6–45.3)
MCH RBC QN AUTO: 30.3 PG (ref 26.6–33)
MCHC RBC AUTO-ENTMCNC: 34.2 G/DL (ref 31.5–35.7)
MCV RBC AUTO: 88.7 FL (ref 79–97)
MONOCYTES # BLD AUTO: 0.58 10*3/MM3 (ref 0.1–0.9)
MONOCYTES NFR BLD AUTO: 8.5 % (ref 5–12)
NEUTROPHILS NFR BLD AUTO: 4.03 10*3/MM3 (ref 1.7–7)
NEUTROPHILS NFR BLD AUTO: 59.3 % (ref 42.7–76)
PLATELET # BLD AUTO: 216 10*3/MM3 (ref 140–450)
PMV BLD AUTO: 11 FL (ref 6–12)
POTASSIUM SERPL-SCNC: 5 MMOL/L (ref 3.5–5.2)
PROT SERPL-MCNC: 7.2 G/DL (ref 6–8.5)
RBC # BLD AUTO: 5.47 10*6/MM3 (ref 4.14–5.8)
SODIUM SERPL-SCNC: 142 MMOL/L (ref 136–145)
TRIGL SERPL-MCNC: 60 MG/DL (ref 0–150)
TSH SERPL DL<=0.05 MIU/L-ACNC: 2.21 UIU/ML (ref 0.27–4.2)
VLDLC SERPL-MCNC: 13 MG/DL (ref 5–40)
WBC NRBC COR # BLD AUTO: 6.8 10*3/MM3 (ref 3.4–10.8)

## 2024-01-17 PROCEDURE — 80061 LIPID PANEL: CPT

## 2024-01-17 PROCEDURE — 86803 HEPATITIS C AB TEST: CPT

## 2024-01-17 PROCEDURE — 80050 GENERAL HEALTH PANEL: CPT

## 2024-01-17 PROCEDURE — 36415 COLL VENOUS BLD VENIPUNCTURE: CPT

## 2024-01-18 DIAGNOSIS — R74.8 ELEVATED LIVER ENZYMES: Primary | ICD-10-CM

## 2024-02-26 DIAGNOSIS — F41.8 MIXED ANXIETY AND DEPRESSIVE DISORDER: ICD-10-CM

## 2024-02-26 RX ORDER — METHYLPREDNISOLONE 4 MG/1
TABLET ORAL
Qty: 21 TABLET | Refills: 0 | Status: SHIPPED | OUTPATIENT
Start: 2024-02-26

## 2024-02-26 RX ORDER — TIZANIDINE 4 MG/1
4 TABLET ORAL EVERY 6 HOURS PRN
Qty: 30 TABLET | Refills: 0 | Status: SHIPPED | OUTPATIENT
Start: 2024-02-26

## 2024-02-26 NOTE — PROGRESS NOTES
Pt c/o lower back pain with radiating pain to stef legs, right worse than left. No recent injury. 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 Is also needing refill of zoloft. Pt has had recent visit with pcp.

## 2024-03-19 ENCOUNTER — TELEPHONE (OUTPATIENT)
Dept: FAMILY MEDICINE CLINIC | Age: 34
End: 2024-03-19
Payer: COMMERCIAL

## 2024-07-30 ENCOUNTER — TELEPHONE (OUTPATIENT)
Dept: CARDIOLOGY | Facility: CLINIC | Age: 34
End: 2024-07-30
Payer: COMMERCIAL

## 2024-07-30 NOTE — TELEPHONE ENCOUNTER
Pt's mom (Helena Arrieta) called re: Would we take her son as a new patient for valve insuffiencey? Pt was prior seen by Teresa and [facility].      Pt is ok to be seen by Dr Clark.  Please call pt at [phone] to get him in with Dr Clark.

## 2024-08-05 NOTE — TELEPHONE ENCOUNTER
Left message for patient to call back to schedule. [facility] has [facility] openings on 8/13 that we could get patient in.

## 2024-08-13 ENCOUNTER — OFFICE VISIT (OUTPATIENT)
Dept: CARDIOLOGY | Facility: CLINIC | Age: 34
End: 2024-08-13
Payer: COMMERCIAL

## 2024-08-13 ENCOUNTER — TELEPHONE (OUTPATIENT)
Dept: CARDIOLOGY | Facility: CLINIC | Age: 34
End: 2024-08-13

## 2024-08-13 VITALS
HEART RATE: 48 BPM | BODY MASS INDEX: 39.65 KG/M2 | WEIGHT: 277 LBS | DIASTOLIC BLOOD PRESSURE: 86 MMHG | HEIGHT: 70 IN | SYSTOLIC BLOOD PRESSURE: 136 MMHG

## 2024-08-13 DIAGNOSIS — G47.33 OSA (OBSTRUCTIVE SLEEP APNEA): ICD-10-CM

## 2024-08-13 DIAGNOSIS — I10 ESSENTIAL HYPERTENSION: ICD-10-CM

## 2024-08-13 DIAGNOSIS — I35.1 NONRHEUMATIC AORTIC VALVE INSUFFICIENCY: ICD-10-CM

## 2024-08-13 DIAGNOSIS — I71.21 ANEURYSM OF ASCENDING AORTA WITHOUT RUPTURE: Primary | ICD-10-CM

## 2024-08-13 PROCEDURE — 93000 ELECTROCARDIOGRAM COMPLETE: CPT | Performed by: INTERNAL MEDICINE

## 2024-08-13 PROCEDURE — 99204 OFFICE O/P NEW MOD 45 MIN: CPT | Performed by: INTERNAL MEDICINE

## 2024-08-13 NOTE — TELEPHONE ENCOUNTER
Patient will need echo and MR angiogram.  Will need liver function test following this depending on plan of care.  He is also to call next week with blood pressure readings.      Please arrange for follow-up with me in 3 monthsCheri,

## 2024-08-13 NOTE — PROGRESS NOTES
Date of Office Visit: 2024  Encounter Provider: Charlene Clark MD  Place of Service: [facility]  Patient Name: Inderjit Lala  :1990    Chief complaint  Consult requested by FANY Acosta    History of Present Illness  Patient is a 33year-old gentleman with hypertension and aortic valve disease.  His sports physical in high school noted to have a murmur age 16.  Echo showed aortic regurgitation (uncertain degree) treadmill test was unremarkable.  He has had serial annual echocardiograms.  He saw Dr. Parada in 2022 at [facility].  EKG showed sinus rhythm with sinus bradycardia.  Echocardiogram was performed at that time but results are not available.    Patient is fairly active at the [employer].  He has had no shortness of breath chest pain fatigue edema diaphoresis he has had palpitations.  He has sleep apnea and uses CPAP consistently and has routine follow-up.  Blood pressure at home has been in the 130s to over 70s.  Heart rate has been in the 50s.  Patient has a history of migraines but has responded well to propranolol even before treating sleep apnea.  Has had palpitations for possibly a year occurring several times a month usually at rest.  As a skipping type sensation that is very brief he denies any dizziness with these events    Past Medical History:   Diagnosis Date    Anxiety     Aortic regurgitation     Aortic valve disorder     Aortic valve insufficiency     Chicken pox     COVID-19     Epididymitis     Hypertension      Past Surgical History:   Procedure Laterality Date    EAR TUBES      pressure equalization tubes  and     KNEE ARTHROSCOPY Left 2009    VASECTOMY  2019    WISDOM TOOTH EXTRACTION       Outpatient Medications Prior to Visit   Medication Sig Dispense Refill    propranolol XL (INNOPRAN XL) 120 MG 24 hr capsule Take 1 capsule by mouth 2 (Two) Times a Day.      sertraline (ZOLOFT) 50 MG tablet Take 1 tablet by mouth  "Daily. 90 tablet 1    methylPREDNISolone (MEDROL) 4 MG dose pack Take as directed on package instructions. 21 tablet 0    multivitamin with minerals tablet tablet Take 1 tablet by mouth Daily. (Patient not taking: Reported on 8/13/2024)      tiZANidine (Zanaflex) 4 MG tablet Take 1 tablet by mouth Every 6 (Six) Hours As Needed for Muscle Spasms. 30 tablet 0     No facility-administered medications prior to visit.       Allergies as of 08/13/2024    (No Known Allergies)     Social History     Socioeconomic History    Marital status:     Number of children: 2   Tobacco Use    Smoking status: Never    Smokeless tobacco: Never   Vaping Use    Vaping status: Never Used   Substance and Sexual Activity    Alcohol use: Yes     Comment: rare     Family History   Problem Relation Age of Onset    Hypertension Mother     Anxiety disorder Mother     Depression Mother     Arrhythmia Father         afib    Heart failure Father     Hypertension Father     Hyperlipidemia Father     Diabetes type II Father     Hypertension Sister     Hypertension Brother     Heart failure Maternal Grandmother     Diabetes type II Paternal Grandmother      Review of Systems   Constitutional: Negative for chills, fever, weight gain and weight loss.   Cardiovascular:  Positive for palpitations. Negative for leg swelling.   Respiratory:  Negative for cough, snoring and wheezing.    Hematologic/Lymphatic: Negative for bleeding problem. Does not bruise/bleed easily.   Skin:  Negative for color change.   Musculoskeletal:  Negative for falls, joint pain and myalgias.   Gastrointestinal:  Negative for melena.   Genitourinary:  Negative for hematuria.   Neurological:  Negative for excessive daytime sleepiness.   Psychiatric/Behavioral:  Negative for depression. The patient is not nervous/anxious.         Objective:     Vitals:    08/13/24 1023 08/13/24 1036   BP: 132/80 136/86   Pulse: (!) 48    Weight: 126 kg (277 lb)    Height: 177.8 cm (70\")  "     Body mass index is 39.75 kg/m².    Vitals reviewed.   Constitutional:       Appearance: Well-developed.   Eyes:      General: No scleral icterus.        Right eye: No discharge.      Conjunctiva/sclera: Conjunctivae normal.      Pupils: Pupils are equal, round, and reactive to light.   HENT:      Head: Normocephalic.      Nose: Nose normal.   Neck:      Thyroid: No thyromegaly.      Vascular: No JVD.   Pulmonary:      Effort: Pulmonary effort is normal. No respiratory distress.      Breath sounds: Normal breath sounds. No wheezing. No rales.   Cardiovascular:      Normal rate. Regular rhythm. Normal S1. Normal S2.       Murmurs: There is a grade 2/6 systolic murmur at the URSB and ULSB.      No gallop.    Pulses:     Intact distal pulses.      Carotid: 2+ bilaterally.     Radial: 2+ bilaterally.     Femoral: 2+ bilaterally.     Popliteal: 2+ bilaterally.     Dorsalis pedis: 2+ bilaterally.     Posterior tibial: 2+ bilaterally.  Edema:     Peripheral edema absent.   Abdominal:      General: Bowel sounds are normal. There is no distension.      Palpations: Abdomen is soft.      Tenderness: There is no abdominal tenderness. There is no rebound.   Musculoskeletal: Normal range of motion.         General: No tenderness.      Cervical back: Normal range of motion and neck supple. Skin:     General: Skin is warm and dry.      Findings: No erythema or rash.   Neurological:      Mental Status: Alert and oriented to person, place, and time.   Psychiatric:         Behavior: Behavior normal.         Thought Content: Thought content normal.         Judgment: Judgment normal.       Lab Review:   Lab Results - Last 18 Months   Lab Units 01/17/24  1008   WBC 10*3/mm3 6.80   RBC 10*6/mm3 5.47   HEMOGLOBIN g/dL 16.6   HEMATOCRIT % 48.5   MCV fL 88.7   MCH pg 30.3   MCHC g/dL 34.2   RDW % 12.7   PLATELETS 10*3/mm3 216   NEUTROPHIL % % 59.3   LYMPHOCYTE % % 28.8   MONOCYTES % % 8.5   EOSINOPHIL % % 2.1   BASOPHIL % % 1.0   NEUTROS  ABS 10*3/mm3 4.03   LYMPHS ABS 10*3/mm3 1.96   MONOS ABS 10*3/mm3 0.58   EOS ABS 10*3/mm3 0.14   BASOS ABS 10*3/mm3 0.07   RDW-SD fl 41.3   MPV fL 11.0       Lab Results - Last 18 Months   Lab Units 01/17/24  1008   GLUCOSE mg/dL 95   BUN mg/dL 20   CREATININE mg/dL 1.11   SODIUM mmol/L 142   POTASSIUM mmol/L 5.0   CHLORIDE mmol/L 103   CO2 mmol/L 28.6   CALCIUM mg/dL 10.0   TOTAL PROTEIN g/dL 7.2   ALBUMIN g/dL 4.7   ALT (SGPT) U/L 99*   AST (SGOT) U/L 45*   ALK PHOS U/L 59   BILIRUBIN mg/dL 0.9   GLOBULIN gm/dL 2.5   A/G RATIO g/dL 1.9   BUN / CREAT RATIO  18.0   ANION GAP mmol/L 10.4   EGFR mL/min/1.73 91.0     Lab Results - Last 18 Months   Lab Units 01/17/24  1008   CHOLESTEROL mg/dL 136   TRIGLYCERIDES mg/dL 60   HDL CHOL mg/dL 41   LDL CHOL mg/dL 82   VLDL CHOL mg/dL 13   LDL/HDL RATIO  2.02       Lab Results - Last 18 Months   Lab Units 01/17/24  1008   TSH uIU/mL 2.210       ECG 12 Lead    Date/Time: 8/13/2024 10:40 AM  Performed by: Charlene Clark MD    Authorized by: Charlene Clark MD  Comparison: not compared with previous ECG   Rhythm: sinus bradycardia    Clinical impression: abnormal EKG            Diagnosis Plan   1. Aneurysm of ascending aorta without rupture  MRI Angiogram Chest With & Without Contrast      2. Nonrheumatic aortic valve insufficiency  Adult Transthoracic Echo Complete W/ Cont if Necessary Per Protocol    MRI Angiogram Chest With & Without Contrast      3. Essential hypertension        4. ARLENE (obstructive sleep apnea)          Plan:       1.  Aortic valve disease with aortic regurgitation.  We were able to obtain echo from 2022 that showed normal systolic function with ejection fraction 55 to 60% with mild left ventricular hypertrophy.  There is mild to moderate aortic valve regurgitation with aortic valve calcification.  There is no mention of leaflet number.  Other valves are structurally and functionally normal.  Left atrium is mildly enlarged.  Will check a follow-up echocardiogram.   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  This has a potential negative impact on aortic regurgitation.  Have asked him to minimize this is much as possible.  He believes there are his equipment available that can assist with this.  He has been using SP prophylaxis and I recommended he continue to do so.  2.  Palpitations.  Check an echocardiogram.  These episodes are fairly brief.  Will address hypertension further and if they persist despite adequate blood pressure control, place a Zio patch especially if there is more significant valvular/structural heart disease.  3.  Hypertension.  This is noted at a very young age since 2008.  Given aortic valve disease, will check MR angiogram to assess for coarctation.  4.  Elevated liver function tests.  He will follow-up with FANY Acosta unless additional testing is needed after results of echo and MR angiogram are available.   5.  Sinus bradycardia. On propranolol and he remains asymptomatic.  As he has done well on this dose of propranolol will defer dose reduction.  6.  Obstructive sleep apnea, on CPAP therapy.  7.  Migraine headaches.  Controlled on propranolol and with use of CPAP.    Time Spent: I spent 55 minutes caring for Inderjit on this date of service. This time includes time spent by me in the following activities: preparing for the visit, reviewing tests, obtaining and/or reviewing a separately obtained history, performing a medically appropriate examination and/or evaluation, counseling and educating the patient/family/caregiver, ordering medications, tests, or procedures, documenting information in the medical record, and independently interpreting results and communicating that information with the patient/family/caregiver.   I spent 1 minutes on the separately reported service of ECG. This time is not included in the time used to support the E/M service also reported today.        Your medication  list            Accurate as of August 13, 2024 11:59 PM. If you have any questions, ask your nurse or doctor.                CONTINUE taking these medications        Instructions Last Dose Given Next Dose Due   propranolol  MG 24 hr capsule  Commonly known as: INNOPRAN XL      Take 1 capsule by mouth 2 (Two) Times a Day.       sertraline 50 MG tablet  Commonly known as: ZOLOFT      Take 1 tablet by mouth Daily.                Patient is no longer taking -.  I corrected the med list to reflect this.  I did not stop these medications.      Dictated utilizing Dragon dictation

## 2024-08-14 PROBLEM — G47.33 OSA (OBSTRUCTIVE SLEEP APNEA): Status: ACTIVE | Noted: 2024-08-14

## 2024-08-23 NOTE — TELEPHONE ENCOUNTER
Echo is scheduled for 8/29/2024 in our office and MR angiogram for 9/25/2024 at [facility].

## 2024-08-23 NOTE — TELEPHONE ENCOUNTER
Pt called back with BP Readings.    Average is 130-135/70s and only 2 readings at 143-144/70s.    All testing is scheduled.

## 2024-08-28 ENCOUNTER — TELEPHONE (OUTPATIENT)
Dept: CARDIOLOGY | Facility: CLINIC | Age: 34
End: 2024-08-28
Payer: COMMERCIAL

## 2024-08-29 ENCOUNTER — TELEPHONE (OUTPATIENT)
Dept: CARDIOLOGY | Facility: CLINIC | Age: 34
End: 2024-08-29
Payer: COMMERCIAL

## 2024-08-29 ENCOUNTER — HOSPITAL ENCOUNTER (OUTPATIENT)
Dept: CARDIOLOGY | Facility: HOSPITAL | Age: 34
Discharge: HOME | End: 2024-08-29
Admitting: INTERNAL MEDICINE
Payer: COMMERCIAL

## 2024-08-29 VITALS
HEIGHT: 70 IN | WEIGHT: 277.78 LBS | BODY MASS INDEX: 39.77 KG/M2 | SYSTOLIC BLOOD PRESSURE: 140 MMHG | DIASTOLIC BLOOD PRESSURE: 80 MMHG | HEART RATE: 55 BPM

## 2024-08-29 DIAGNOSIS — I35.1 NONRHEUMATIC AORTIC VALVE INSUFFICIENCY: ICD-10-CM

## 2024-08-29 LAB
AORTIC DIMENSIONLESS INDEX: 0.6 (DI)
ASCENDING AORTA: 2.6 CM
BH CV ECHO MEAS - ACS: 2.15 CM
BH CV ECHO MEAS - AI P1/2T: 732.2 MSEC
BH CV ECHO MEAS - AO MAX PG: 20.8 MMHG
BH CV ECHO MEAS - AO MEAN PG: 11.2 MMHG
BH CV ECHO MEAS - AO ROOT DIAM: 3.5 CM
BH CV ECHO MEAS - AO V2 MAX: 228 CM/SEC
BH CV ECHO MEAS - AO V2 VTI: 50.3 CM
BH CV ECHO MEAS - AVA(I,D): 2.6 CM2
BH CV ECHO MEAS - EDV(CUBED): 89.1 ML
BH CV ECHO MEAS - EDV(MOD-SP2): 140 ML
BH CV ECHO MEAS - EDV(MOD-SP4): 246 ML
BH CV ECHO MEAS - EF(MOD-BP): 68.2 %
BH CV ECHO MEAS - EF(MOD-SP2): 67.1 %
BH CV ECHO MEAS - EF(MOD-SP4): 68.3 %
BH CV ECHO MEAS - ESV(CUBED): 28.3 ML
BH CV ECHO MEAS - ESV(MOD-SP2): 46 ML
BH CV ECHO MEAS - ESV(MOD-SP4): 78 ML
BH CV ECHO MEAS - FS: 31.8 %
BH CV ECHO MEAS - IVS/LVPW: 0.99 CM
BH CV ECHO MEAS - IVSD: 1.15 CM
BH CV ECHO MEAS - LAT PEAK E' VEL: 15.8 CM/SEC
BH CV ECHO MEAS - LV DIASTOLIC VOL/BSA (35-75): 102.8 CM2
BH CV ECHO MEAS - LV MASS(C)D: 185.1 GRAMS
BH CV ECHO MEAS - LV MAX PG: 6.6 MMHG
BH CV ECHO MEAS - LV MEAN PG: 3.3 MMHG
BH CV ECHO MEAS - LV SYSTOLIC VOL/BSA (12-30): 32.6 CM2
BH CV ECHO MEAS - LV V1 MAX: 128.1 CM/SEC
BH CV ECHO MEAS - LV V1 VTI: 28 CM
BH CV ECHO MEAS - LVIDD: 4.5 CM
BH CV ECHO MEAS - LVIDS: 3 CM
BH CV ECHO MEAS - LVOT AREA: 4.7 CM2
BH CV ECHO MEAS - LVOT DIAM: 2.46 CM
BH CV ECHO MEAS - LVPWD: 1.16 CM
BH CV ECHO MEAS - MED PEAK E' VEL: 12.8 CM/SEC
BH CV ECHO MEAS - MR MAX PG: 24.7 MMHG
BH CV ECHO MEAS - MR MAX VEL: 248.4 CM/SEC
BH CV ECHO MEAS - MV A DUR: 0.13 SEC
BH CV ECHO MEAS - MV A MAX VEL: 66 CM/SEC
BH CV ECHO MEAS - MV DEC SLOPE: 633.9 CM/SEC2
BH CV ECHO MEAS - MV DEC TIME: 0.19 SEC
BH CV ECHO MEAS - MV E MAX VEL: 83.6 CM/SEC
BH CV ECHO MEAS - MV E/A: 1.27
BH CV ECHO MEAS - MV MAX PG: 4.3 MMHG
BH CV ECHO MEAS - MV MEAN PG: 1.63 MMHG
BH CV ECHO MEAS - MV P1/2T: 47.7 MSEC
BH CV ECHO MEAS - MV V2 VTI: 30.4 CM
BH CV ECHO MEAS - MVA(P1/2T): 4.6 CM2
BH CV ECHO MEAS - MVA(VTI): 4.4 CM2
BH CV ECHO MEAS - PA ACC TIME: 0.13 SEC
BH CV ECHO MEAS - PA V2 MAX: 146.6 CM/SEC
BH CV ECHO MEAS - PULM A REVS DUR: 0.16 SEC
BH CV ECHO MEAS - PULM A REVS VEL: 34.1 CM/SEC
BH CV ECHO MEAS - PULM DIAS VEL: 34.1 CM/SEC
BH CV ECHO MEAS - PULM S/D: 0.71
BH CV ECHO MEAS - PULM SYS VEL: 24.4 CM/SEC
BH CV ECHO MEAS - QP/QS: 0.53
BH CV ECHO MEAS - RV MAX PG: 4.2 MMHG
BH CV ECHO MEAS - RV V1 MAX: 102 CM/SEC
BH CV ECHO MEAS - RV V1 VTI: 22.1 CM
BH CV ECHO MEAS - RVOT DIAM: 2.02 CM
BH CV ECHO MEAS - SV(LVOT): 132.9 ML
BH CV ECHO MEAS - SV(MOD-SP2): 94 ML
BH CV ECHO MEAS - SV(MOD-SP4): 168 ML
BH CV ECHO MEAS - SV(RVOT): 71.1 ML
BH CV ECHO MEAS - SVI(LVOT): 55.6 ML/M2
BH CV ECHO MEAS - SVI(MOD-SP2): 39.3 ML/M2
BH CV ECHO MEAS - SVI(MOD-SP4): 70.2 ML/M2
BH CV ECHO MEASUREMENTS AVERAGE E/E' RATIO: 5.85
BH CV XLRA - RV BASE: 3.7 CM
BH CV XLRA - RV MID: 4.9 CM
BH CV XLRA - TDI S': 17.5 CM/SEC
LEFT ATRIUM VOLUME INDEX: 25.4 ML/M2
SINUS: 2.9 CM
STJ: 2.32 CM

## 2024-08-29 PROCEDURE — 25510000001 PERFLUTREN 6.52 MG/ML SUSPENSION 2 ML VIAL: Performed by: INTERNAL MEDICINE

## 2024-08-29 PROCEDURE — 93306 TTE W/DOPPLER COMPLETE: CPT

## 2024-08-29 RX ADMIN — PERFLUTREN 2 ML: 6.52 INJECTION, SUSPENSION INTRAVENOUS at 15:49

## 2024-08-29 NOTE — TELEPHONE ENCOUNTER
Please let him know that echocardiogram shows his heart is strong but there is significant leakage from his aortic valve (this was known from before) the aortic valve itself was not well-seen and we feel it is best that he proceed with a DEYSI to assess this further.  This was discussed with him at recent visit as a possibility.  However, he needs MR angiogram first and this is not scheduled until the end of September.  Please see if this can be moved up so that we can do DEYSI after MR angiogram is completed.

## 2024-08-30 NOTE — TELEPHONE ENCOUNTER
Called and left VM. Will continue to try to reach patient. HUB transfer call to triage.     Minerva Lam RN  Triage [facility]

## 2024-09-04 NOTE — PROGRESS NOTES
Pt asking when MRA is scheduled.  Pt instructed to call [facility] and see if it can be moved up and then contact cardiology to update them on new date. john Suarez

## 2024-09-19 DIAGNOSIS — F41.8 MIXED ANXIETY AND DEPRESSIVE DISORDER: ICD-10-CM

## 2024-09-26 ENCOUNTER — HOSPITAL ENCOUNTER (OUTPATIENT)
Dept: MRI IMAGING | Facility: HOSPITAL | Age: 34
Discharge: HOME | End: 2024-09-26
Admitting: INTERNAL MEDICINE
Payer: COMMERCIAL

## 2024-09-26 DIAGNOSIS — I71.21 ANEURYSM OF ASCENDING AORTA WITHOUT RUPTURE: ICD-10-CM

## 2024-09-26 DIAGNOSIS — I35.1 NONRHEUMATIC AORTIC VALVE INSUFFICIENCY: ICD-10-CM

## 2024-09-26 LAB
CREAT BLDA-MCNC: 1.2 MG/DL (ref 0.6–1.3)
EGFRCR SERPLBLD CKD-EPI 2021: 82.9 ML/MIN/1.73

## 2024-09-26 PROCEDURE — 0 GADOBENATE DIMEGLUMINE 529 MG/ML SOLUTION: Performed by: INTERNAL MEDICINE

## 2024-09-26 PROCEDURE — A9577 INJ MULTIHANCE: HCPCS | Performed by: INTERNAL MEDICINE

## 2024-09-26 PROCEDURE — 82565 ASSAY OF CREATININE: CPT

## 2024-09-26 PROCEDURE — C8911 MRA W/O FOL W/CONT, CHEST: HCPCS

## 2024-09-26 RX ADMIN — GADOBENATE DIMEGLUMINE 20 ML: 529 INJECTION, SOLUTION INTRAVENOUS at 15:39

## 2024-09-30 ENCOUNTER — TELEPHONE (OUTPATIENT)
Dept: CARDIOLOGY | Facility: CLINIC | Age: 34
End: 2024-09-30

## 2024-09-30 NOTE — TELEPHONE ENCOUNTER
Caller: DALTON MIRELES    Relationship to patient: Mother    Best call back number: [phone]    Patient is needing: PATIENTS MOTHER JUST LETTING DR HAWKINS KNOW THAT HIS MRI WAS COMPLETED IN [facility].

## 2024-10-01 ENCOUNTER — TELEPHONE (OUTPATIENT)
Dept: CARDIOLOGY | Facility: CLINIC | Age: 34
End: 2024-10-01
Payer: COMMERCIAL

## 2024-10-01 NOTE — TELEPHONE ENCOUNTER
Results and recommendations called to pt.  Instructed to call with any further questions or concerns.  Verbalized understanding.    Iraida Adam RN  Triage Nurse, [facility]  10/01/24 12:56 EDT

## 2024-10-01 NOTE — TELEPHONE ENCOUNTER
Please let him know that MRI of the chest showed stable aortic size.  No evidence of acute aneurysm or dissection seen per radiology report.  Continue current medications and keep currently scheduled follow-up appointment.

## 2024-11-15 ENCOUNTER — OFFICE VISIT (OUTPATIENT)
Dept: CARDIOLOGY | Facility: CLINIC | Age: 34
End: 2024-11-15
Payer: COMMERCIAL

## 2024-11-15 VITALS
WEIGHT: 283.2 LBS | HEART RATE: 50 BPM | DIASTOLIC BLOOD PRESSURE: 88 MMHG | HEIGHT: 69 IN | SYSTOLIC BLOOD PRESSURE: 138 MMHG | BODY MASS INDEX: 41.95 KG/M2

## 2024-11-15 DIAGNOSIS — I10 ESSENTIAL HYPERTENSION: Primary | ICD-10-CM

## 2024-11-15 DIAGNOSIS — I35.1 NONRHEUMATIC AORTIC VALVE INSUFFICIENCY: ICD-10-CM

## 2024-11-15 PROCEDURE — 99214 OFFICE O/P EST MOD 30 MIN: CPT | Performed by: INTERNAL MEDICINE

## 2024-11-15 PROCEDURE — 93000 ELECTROCARDIOGRAM COMPLETE: CPT | Performed by: INTERNAL MEDICINE

## 2024-11-15 RX ORDER — LOSARTAN POTASSIUM 25 MG/1
25 TABLET ORAL DAILY
Qty: 90 TABLET | Refills: 3 | Status: SHIPPED | OUTPATIENT
Start: 2024-11-15

## 2024-11-15 NOTE — PROGRESS NOTES
Date of Office Visit: 11/15/2024  Encounter Provider: Charlene Clark MD  Place of Service: [facility] CARDIOLOGY  Patient Name: Inderjit Lala  :1990    Chief complaint  Aortic regurgitation    History of Present Illness  Patient is a 33-year-old gentleman with hypertension and aortic valve disease.  At a sports physical age 16 he was noted to have aortic regurgitation of uncertain degree.  Treadmill test was unremarkable.  He is been followed by serial annual echocardiograms since then.  On 2022 EKG showed sinus bradycardia echo was performed at that time but results are unavailable.  MR angiogram of the chest on 2024 showed no evidence of thoracic aortic aneurysm or dissection there was no mention of coarctation..  Echocardiogram showed an ejection fraction 68% with normal left ventricular cavity size, normal diastolic function, normal RV size and function normal atrial size with sclerotic aortic valve.  Leaflet number cannot be determined.  There was moderate to severe aortic regurgitation.    Since last visit patient is using CPAP consistently has had no chest pain, shortness of breath, palpitations, syncope near syncope.  Relates his blood pressure may be elevated like it is today only checked sporadically.  He has chronic headaches that are unchanged.  Prior CT scanning of the head unremarkable  this has been attributed to migraine headaches.    Past Medical History:   Diagnosis Date    Anxiety     Aortic regurgitation     Aortic valve disorder     Aortic valve insufficiency     Chicken pox     COVID-19     Epididymitis     Hypertension      Past Surgical History:   Procedure Laterality Date    EAR TUBES      pressure equalization tubes  and     KNEE ARTHROSCOPY Left 2009    VASECTOMY  2019    WISDOM TOOTH EXTRACTION       Outpatient Medications Prior to Visit   Medication Sig Dispense Refill    propranolol XL (INNOPRAN XL) 120 MG 24 hr capsule Take 1  "capsule by mouth 2 (Two) Times a Day. 180 capsule 3    sertraline (ZOLOFT) 50 MG tablet TAKE 1 TABLET BY MOUTH DAILY 90 tablet 0     No facility-administered medications prior to visit.       Allergies as of 11/15/2024    (No Known Allergies)     Social History     Socioeconomic History    Marital status:     Number of children: 2   Tobacco Use    Smoking status: Never    Smokeless tobacco: Never   Vaping Use    Vaping status: Never Used   Substance and Sexual Activity    Alcohol use: Yes     Comment: rare     Family History   Problem Relation Age of Onset    Hypertension Mother     Anxiety disorder Mother     Depression Mother     Arrhythmia Father         afib    Heart failure Father     Hypertension Father     Hyperlipidemia Father     Diabetes type II Father     Hypertension Sister     Hypertension Brother     Heart failure Maternal Grandmother     Diabetes type II Paternal Grandmother      Review of Systems   Constitutional: Negative for chills, fever, weight gain and weight loss.   Cardiovascular:  Negative for leg swelling.   Respiratory:  Negative for cough, snoring and wheezing.    Hematologic/Lymphatic: Negative for bleeding problem. Does not bruise/bleed easily.   Skin:  Negative for color change.   Musculoskeletal:  Negative for falls, joint pain and myalgias.   Gastrointestinal:  Negative for melena.   Genitourinary:  Negative for hematuria.   Neurological:  Positive for headaches. Negative for excessive daytime sleepiness.   Psychiatric/Behavioral:  Negative for depression. The patient is not nervous/anxious.         Objective:     Vitals:    11/15/24 0937   BP: 138/88   BP Location: Left arm   Patient Position: Sitting   Cuff Size: Large Adult   Pulse: 50   Weight: 128 kg (283 lb 3.2 oz)   Height: 175.3 cm (69\")     Body mass index is 41.82 kg/m².    Vitals reviewed.   Constitutional:       Appearance: Well-developed. Morbidly obese.   Eyes:      General: No scleral icterus.        Right eye: " No discharge.      Conjunctiva/sclera: Conjunctivae normal.      Pupils: Pupils are equal, round, and reactive to light.   HENT:      Head: Normocephalic.      Nose: Nose normal.   Neck:      Thyroid: No thyromegaly.      Vascular: No JVD.   Pulmonary:      Effort: Pulmonary effort is normal. No respiratory distress.      Breath sounds: Normal breath sounds. No wheezing. No rales.   Cardiovascular:      Normal rate. Regular rhythm. Normal S1. Normal S2.       Murmurs: There is no murmur.      No gallop.    Pulses:     Intact distal pulses.      Carotid: 2+ bilaterally.     Radial: 2+ bilaterally.     Femoral: 2+ bilaterally.     Popliteal: 2+ bilaterally.     Dorsalis pedis: 2+ bilaterally.     Posterior tibial: 2+ bilaterally.  Edema:     Peripheral edema absent.   Abdominal:      General: Bowel sounds are normal. There is no distension.      Palpations: Abdomen is soft.      Tenderness: There is no abdominal tenderness. There is no rebound.   Musculoskeletal: Normal range of motion.         General: No tenderness.      Cervical back: Normal range of motion and neck supple. Skin:     General: Skin is warm and dry.      Findings: No erythema or rash.   Neurological:      Mental Status: Alert and oriented to person, place, and time.   Psychiatric:         Behavior: Behavior normal.         Thought Content: Thought content normal.         Judgment: Judgment normal.       Lab Review:   Lab Results - Last 18 Months   Lab Units 01/17/24  1008   WBC 10*3/mm3 6.80   RBC 10*6/mm3 5.47   HEMOGLOBIN g/dL 16.6   HEMATOCRIT % 48.5   MCV fL 88.7   MCH pg 30.3   MCHC g/dL 34.2   RDW % 12.7   PLATELETS 10*3/mm3 216   NEUTROPHIL % % 59.3   LYMPHOCYTE % % 28.8   MONOCYTES % % 8.5   EOSINOPHIL % % 2.1   BASOPHIL % % 1.0   NEUTROS ABS 10*3/mm3 4.03   LYMPHS ABS 10*3/mm3 1.96   MONOS ABS 10*3/mm3 0.58   EOS ABS 10*3/mm3 0.14   BASOS ABS 10*3/mm3 0.07   RDW-SD fl 41.3   MPV fL 11.0       Lab Results - Last 18 Months   Lab Units  09/26/24  1502 01/17/24  1008   GLUCOSE mg/dL  --  95   BUN mg/dL  --  20   CREATININE mg/dL 1.20 1.11   SODIUM mmol/L  --  142   POTASSIUM mmol/L  --  5.0   CHLORIDE mmol/L  --  103   CO2 mmol/L  --  28.6   CALCIUM mg/dL  --  10.0   TOTAL PROTEIN g/dL  --  7.2   ALBUMIN g/dL  --  4.7   ALT (SGPT) U/L  --  99*   AST (SGOT) U/L  --  45*   ALK PHOS U/L  --  59   BILIRUBIN mg/dL  --  0.9   GLOBULIN gm/dL  --  2.5   A/G RATIO g/dL  --  1.9   BUN / CREAT RATIO   --  18.0   ANION GAP mmol/L  --  10.4   EGFR mL/min/1.73 82.9 91.0     Lab Results - Last 18 Months   Lab Units 01/17/24  1008   CHOLESTEROL mg/dL 136   TRIGLYCERIDES mg/dL 60   HDL CHOL mg/dL 41   LDL CHOL mg/dL 82   VLDL CHOL mg/dL 13   LDL/HDL RATIO  2.02     Lab Results - Last 18 Months   Lab Units 01/17/24  1008   TSH uIU/mL 2.210         ECG 12 Lead    Date/Time: 11/15/2024 12:37 AM  Performed by: Charlene Clark MD    Authorized by: Charlene Clark MD  Comparison: compared with previous ECG   Comparison to previous ECG: ST-T wave changes present inferior lateral leads  Rhythm: sinus bradycardia  Other findings: non-specific ST-T wave changes and left ventricular hypertrophy    Clinical impression: abnormal EKG        Assessment:       Diagnosis Plan   1. Essential hypertension  Basic Metabolic Panel    ECG 12 Lead      2. Nonrheumatic aortic valve insufficiency  Adult Transthoracic Echo Complete W/ Cont if Necessary Per Protocol    ECG 12 Lead        Plan:     1.  Aortic valve disease with moderate to severe aortic regurgitation.  He remains asymptomatic.  LV cavity size was not increased.  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  He does not believe it is too physically strenuous.  Blood pressure arianne elevated.  Will add losartan 25 mg today.  BMP next week which she will obtain.  Order has been placed.  Will check echo in 3 months.  If no better we will proceed with DEYSI versus stress echo.  Of note  cardiomegaly noted on MRI  2.  Palpitations.  Seems to have resolved.  3.  Hypertension.  Add losartan as above.  MR angiogram of the chest 9/2024 did not show coarctation.  He will stay on a low-salt diet.  4.  Elevated liver function tests.  Patient was to followed up with a Dr Acosta.  Unclear if this has occurred.  5.  Sinus bradycardia. On propranolol and he remains asymptomatic.  As he has done well on this dose of propranolol will defer dose reduction.  6.  Obstructive sleep apnea, on CPAP therapy.  7.  Migraine headaches.  Controlled on propranolol and with use of CPAP.  8.  Abnormal EKG, as above he has no symptoms of angina or heart failure at this time.  Address aortic valve regurgitation as above and follow-up in 3 months with better blood pressure control.  Anticipate stress echo possible DEYSI at that time    Time Spent: I spent 35 minutes caring for Inderjit on this date of service. This time includes time spent by me in the following activities: preparing for the visit, reviewing tests, obtaining and/or reviewing a separately obtained history, performing a medically appropriate examination and/or evaluation, counseling and educating the patient/family/caregiver, ordering medications, tests, or procedures, documenting information in the medical record, and independently interpreting results and communicating that information with the patient/family/caregiver.   I spent 1 minutes on the separately reported service of ECG. This time is not included in the time used to support the E/M service also reported today.        Your medication list            Accurate as of November 15, 2024 11:59 PM. If you have any questions, ask your nurse or doctor.                START taking these medications        Instructions Last Dose Given Next Dose Due   losartan 25 MG tablet  Commonly known as: Cozaar  Started by: Charlene Clark      Take 1 tablet by mouth Daily.              CONTINUE taking these medications         Instructions Last Dose Given Next Dose Due   propranolol  MG 24 hr capsule  Commonly known as: INNOPRAN XL      Take 1 capsule by mouth 2 (Two) Times a Day.       sertraline 50 MG tablet  Commonly known as: ZOLOFT      TAKE 1 TABLET BY MOUTH DAILY                 Where to Get Your Medications        These medications were sent to [facility] [id number] - [place], [place] - [address] - [phone]  - [phone]  [address] [facility] [address]      Phone: [phone]   losartan 25 MG tablet         Patient is no longer taking -.  I corrected the med list to reflect this.  I did not stop these medications.      Dictated utilizing Dragon dictation

## 2024-11-17 ENCOUNTER — TELEPHONE (OUTPATIENT)
Dept: CARDIOLOGY | Facility: CLINIC | Age: 34
End: 2024-11-17
Payer: COMMERCIAL

## 2024-11-17 DIAGNOSIS — I10 ESSENTIAL HYPERTENSION: Primary | ICD-10-CM

## 2024-11-17 DIAGNOSIS — I35.1 AORTIC VALVE INSUFFICIENCY, ETIOLOGY OF CARDIAC VALVE DISEASE UNSPECIFIED: ICD-10-CM

## 2024-11-17 NOTE — TELEPHONE ENCOUNTER
Please find out how is his blood pressure with the addition of losartan.  Also find out if he followed up with PCP regarding elevated liver function test noted previously.  I do not seeing any results.  Also let him know that EKG showed some changes suggestive of blood pressure affecting heart muscle.  Therefore really need to work on his blood pressure and plan on an echo and additional testing depending on echo results.

## 2024-11-18 NOTE — TELEPHONE ENCOUNTER
I tried to call Inderjit Lala but there was no answer.  Left a voicemail asking patient to call back.  Will continue to try to reach pt.    HUB- if pt calls back, please transfer through to triage.    Thank you,    Natalia RIVERA RN  [place] [facility]  11/18/24 09:30 EST

## 2024-11-19 NOTE — TELEPHONE ENCOUNTER
I tried to call Inderjit Lala but there was no answer.  Left a voicemail asking patient to call back.  Will continue to try to reach pt.    HUB- if pt calls back, please transfer through to triage.    Thank you,    Natalia RIVERA RN  [place] [facility] [place]  11/19/24 09:34 EST

## 2024-11-20 NOTE — TELEPHONE ENCOUNTER
Patient said he hasn't even started the losartan yet because [facility] didn't have the script. I called [facility] myself and they confirmed they have the script ready for . I left a VM, allowed per PENELOPE, for the patient letting him know it is ready and he needs to pick it up ASAP and start it. Patient said he has not FU with PCP in regard to his liver function tests. In regard to his echo, it is scheduled for 2/21/25. Patient states that you told him that it was ok for him to have this done in 3 months. It also looks like that is what is stated in your office note too. Are you ok with this plan or are you wanting this to be completed sooner?    Minerva Lam RN  Triage [facility]

## 2024-11-20 NOTE — TELEPHONE ENCOUNTER
I tried to call Inderjit Lala but there was no answer.  Left a voicemail asking patient to call back.  Will continue to try to reach pt.    HUB- if pt calls back, please transfer through to triage.    Thank you,    Natalia RIVERA RN  Triage [facility] [place]  11/20/24 09:51 EST     Speaking Coherently

## 2024-11-21 NOTE — TELEPHONE ENCOUNTER
So to clarify.  Patient really needs to start losartan, check CMP and stat BMP in 1 week this should also look at liver function test.  If they remain elevated he should really see PCP regarding this and regardless should discuss the transient increase if this is normal.  In addition we will plan on echo in February and depending on Reuff leakiness of the valve will need either DEYSI or stress echo.  Okay with this plan as long as he does not have any worsening shortness of breath or chest pain.  If he has either he should call promptly for earlier appointment

## 2024-11-21 NOTE — TELEPHONE ENCOUNTER
Notified patient of recommendations. Patient verbalized understanding.    Minerva Lam RN  [place] [facility] [place]

## 2024-12-03 ENCOUNTER — LAB (OUTPATIENT)
Dept: LAB | Facility: HOSPITAL | Age: 34
End: 2024-12-03
Payer: COMMERCIAL

## 2024-12-03 DIAGNOSIS — R74.8 ELEVATED LIVER ENZYMES: ICD-10-CM

## 2024-12-03 DIAGNOSIS — I10 ESSENTIAL HYPERTENSION: ICD-10-CM

## 2024-12-03 DIAGNOSIS — I35.1 AORTIC VALVE INSUFFICIENCY, ETIOLOGY OF CARDIAC VALVE DISEASE UNSPECIFIED: ICD-10-CM

## 2024-12-03 LAB
ALBUMIN SERPL-MCNC: 4.6 G/DL (ref 3.5–5.2)
ALBUMIN/GLOB SERPL: 1.6 G/DL
ALP SERPL-CCNC: 63 U/L (ref 39–117)
ALT SERPL W P-5'-P-CCNC: 63 U/L (ref 1–41)
ANION GAP SERPL CALCULATED.3IONS-SCNC: 10 MMOL/L (ref 5–15)
AST SERPL-CCNC: 33 U/L (ref 1–40)
BILIRUB SERPL-MCNC: 0.6 MG/DL (ref 0–1.2)
BUN SERPL-MCNC: 22 MG/DL (ref 6–20)
BUN/CREAT SERPL: 18.5 (ref 7–25)
CALCIUM SPEC-SCNC: 9.4 MG/DL (ref 8.6–10.5)
CHLORIDE SERPL-SCNC: 103 MMOL/L (ref 98–107)
CO2 SERPL-SCNC: 27 MMOL/L (ref 22–29)
CREAT SERPL-MCNC: 1.19 MG/DL (ref 0.76–1.27)
EGFRCR SERPLBLD CKD-EPI 2021: 83.8 ML/MIN/1.73
GLOBULIN UR ELPH-MCNC: 2.8 GM/DL
GLUCOSE SERPL-MCNC: 84 MG/DL (ref 65–99)
NT-PROBNP SERPL-MCNC: <36 PG/ML (ref 0–450)
POTASSIUM SERPL-SCNC: 4.2 MMOL/L (ref 3.5–5.2)
PROT SERPL-MCNC: 7.4 G/DL (ref 6–8.5)
SODIUM SERPL-SCNC: 140 MMOL/L (ref 136–145)

## 2024-12-03 PROCEDURE — 36415 COLL VENOUS BLD VENIPUNCTURE: CPT

## 2024-12-03 PROCEDURE — 83880 ASSAY OF NATRIURETIC PEPTIDE: CPT

## 2024-12-03 PROCEDURE — 80053 COMPREHEN METABOLIC PANEL: CPT

## 2024-12-04 ENCOUNTER — TELEPHONE (OUTPATIENT)
Dept: CARDIOLOGY | Facility: CLINIC | Age: 34
End: 2024-12-04
Payer: COMMERCIAL

## 2024-12-04 DIAGNOSIS — R79.89 ELEVATED LFTS: Primary | ICD-10-CM

## 2024-12-04 RX ORDER — METHYLPREDNISOLONE 4 MG/1
TABLET ORAL
Qty: 21 TABLET | Refills: 0 | Status: SHIPPED | OUTPATIENT
Start: 2024-12-04

## 2024-12-04 NOTE — TELEPHONE ENCOUNTER
I spoke with Inderjit Lala and updated pt on results from provider.  Pt verbalized understanding    Pt wanted to provide an update on his home BP readings- he is running [id number]/58-70s.  HR 59-65.  He's been feeling fine apart from having COVID at this time.    Thank you,    Natalia RIVERA, RN  Triage [facility] [place]  12/04/24 09:53 EST

## 2024-12-05 ENCOUNTER — TELEPHONE (OUTPATIENT)
Dept: FAMILY MEDICINE CLINIC | Age: 34
End: 2024-12-05
Payer: COMMERCIAL

## 2024-12-05 NOTE — TELEPHONE ENCOUNTER
call came yesterday re back pain for a week, has been to chiropractor and has had pain radiating to both legs, sent a steroid pack in to his pharmacy and advised to follow up if not improving

## 2025-01-30 DIAGNOSIS — F41.8 MIXED ANXIETY AND DEPRESSIVE DISORDER: ICD-10-CM

## 2025-01-30 NOTE — TELEPHONE ENCOUNTER
Rx Refill Note  Requested Prescriptions     Pending Prescriptions Disp Refills    sertraline (ZOLOFT) 50 MG tablet 90 tablet 0     Sig: Take 1 tablet by mouth Daily.      Last office visit with prescribing clinician: 01/16/2024, BRANDY Acosta NP  Last telemedicine visit with prescribing clinician: Visit date not found   Next office visit with prescribing clinician: Visit date not found    Left message that he is needing to schedule appt with BRANDY Acosta.    Donya Salas LPN  01/30/25, 14:21 EST

## 2025-02-03 NOTE — TELEPHONE ENCOUNTER
Left message that he is needing to schedule appt with A Acosta.  Its been over a year since he has been in the office.

## 2025-02-11 ENCOUNTER — OFFICE VISIT (OUTPATIENT)
Dept: FAMILY MEDICINE CLINIC | Age: 35
End: 2025-02-11
Payer: COMMERCIAL

## 2025-02-11 VITALS
WEIGHT: 296.4 LBS | HEART RATE: 50 BPM | DIASTOLIC BLOOD PRESSURE: 82 MMHG | HEIGHT: 69 IN | BODY MASS INDEX: 43.9 KG/M2 | SYSTOLIC BLOOD PRESSURE: 170 MMHG | TEMPERATURE: 98 F | OXYGEN SATURATION: 97 %

## 2025-02-11 DIAGNOSIS — M25.50 ARTHRALGIA, UNSPECIFIED JOINT: ICD-10-CM

## 2025-02-11 DIAGNOSIS — N52.8 OTHER MALE ERECTILE DYSFUNCTION: ICD-10-CM

## 2025-02-11 DIAGNOSIS — R79.89 ELEVATED LFTS: ICD-10-CM

## 2025-02-11 DIAGNOSIS — I10 ESSENTIAL HYPERTENSION: Primary | ICD-10-CM

## 2025-02-11 DIAGNOSIS — R53.83 OTHER FATIGUE: ICD-10-CM

## 2025-02-11 DIAGNOSIS — G47.33 OSA (OBSTRUCTIVE SLEEP APNEA): ICD-10-CM

## 2025-02-11 DIAGNOSIS — F41.8 MIXED ANXIETY AND DEPRESSIVE DISORDER: ICD-10-CM

## 2025-02-11 PROCEDURE — 99214 OFFICE O/P EST MOD 30 MIN: CPT | Performed by: NURSE PRACTITIONER

## 2025-02-11 RX ORDER — PROPRANOLOL HYDROCHLORIDE 120 MG/1
CAPSULE, EXTENDED RELEASE ORAL
COMMUNITY
Start: 2025-01-04 | End: 2025-02-11 | Stop reason: ALTCHOICE

## 2025-02-11 RX ORDER — DICLOFENAC SODIUM 75 MG/1
75 TABLET, DELAYED RELEASE ORAL
COMMUNITY
Start: 2025-02-04 | End: 2025-03-06

## 2025-02-11 NOTE — ASSESSMENT & PLAN NOTE
Repeat BP still up, to continue to monitor his bp and follow up with cardiology 2-21-25, continue his rx and will get some labs, he will return fasting

## 2025-02-11 NOTE — ASSESSMENT & PLAN NOTE
He continues to use his Cpap nightly    Risks and benefits were discussed with the patient's daughter.  Risks including infection, allergic reaction, fluid overload, and respiratory failure were all discussed.        This document has been electronically signed by TRACIE De Jesus on November 7, 2018 12:12 PM

## 2025-02-11 NOTE — ASSESSMENT & PLAN NOTE
He was seen elsewhere for back pain and given 2 rx, he is just taking the steroid now, and has the diclofenac, advised to stay off for now

## 2025-02-11 NOTE — PROGRESS NOTES
Chief Complaint  Hypertension (6 month follow up HTN and anxiety. )    Subjective          Inderjit Lala presents to [facility]    History of Present Illness  Hypertension:  Sees cariology and gets refills from cardiology   Current medication: propranaolol (also uses to prevent HA) & losartan   Tolerating Medication: Yes  Checking BP at home and it is: 120-130's 60 -70's  Needs refills: no  Labs:  Lab Results       Component                Value               Date                       GLUCOSE                  84                  12/03/2024                 BUN                      22 (H)              12/03/2024                 CREATININE               1.19                12/03/2024                 BCR                      18.5                12/03/2024                 K                        4.2                 12/03/2024                 CO2                      27.0                12/03/2024                 CALCIUM                  9.4                 12/03/2024                 ALBUMIN                  4.6                 12/03/2024                 AST                      33                  12/03/2024                 ALT                      63 (H)              12/03/2024              Anxiety/ Depression/ Insomnia  Current medication: zoloft   Tolerating medication: Yes, just ran out a month ago   Stressors: nothing unusual, just feels stressed since running out of rx  Current symptoms: anxious   Refills needed:  yes, karolyn     PAST MEDICAL HISTORY changes since 1-:      Aortic and mitral regurg /aneurysm of ascending aorta, scan 9-2024 no dissection, cardiomegaly   covid +   Fractured Left tibia / ankle 2-2022    Chicken pox     Hypertension/ Aortic Regurgitation / on rx, followed by cardiology :     Hx epididymitis         CURRENT MEDICAL PROVIDERS:    Cardiologist: in the past saw Dr Dwayne Barry/ now Clark    Dermatologist: sarah silvestre    Urologist: [facility]           Surgical History:         Pressure Equalization Tubes:  and ;     Arthroscopy: left knee; 2009;     Other Surgeries:    Vasectomy: -;     wisdom teeth extractions;         Family History:       Father: Arrhythmia ( Afib ); Congestive Heart Failure; Hypertension; Hyperlipidemia;  Type 2 Diabetes     Mother: Hypertension;  Anxiety; Depression     Brother(s): 2 brother(s) total;  Hypertension; 1  renal failure 47     Sister(s): 3 sister(s) total;  Hypertension (jojo brodytitow)     Paternal Grandmother: Type 2 Diabetes     Maternal Grandmother: Congestive Heart Failure         Social History:       Occupation: [employer];     Marital Status:      Children: 2 children     ETOH, beer on weekends occ during week           Past Medical History:   Diagnosis Date    Anxiety     Aortic regurgitation     Aortic valve disorder     Aortic valve insufficiency     Chicken pox     COVID-19     Epididymitis     Hypertension        No Known Allergies     Past Surgical History:   Procedure Laterality Date    EAR TUBES      pressure equalization tubes  and     KNEE ARTHROSCOPY Left 2009    VASECTOMY  2019    WISDOM TOOTH EXTRACTION          Social History     Tobacco Use    Smoking status: Never    Smokeless tobacco: Never   Substance Use Topics    Alcohol use: Yes     Comment: rare       Family History   Problem Relation Age of Onset    Hypertension Mother     Anxiety disorder Mother     Depression Mother     Arrhythmia Father         afib    Heart failure Father     Hypertension Father     Hyperlipidemia Father     Diabetes type II Father     Hypertension Sister     Hypertension Brother     Heart failure Maternal Grandmother     Diabetes type II Paternal Grandmother         Health Maintenance Due   Topic Date Due    TDAP/TD VACCINES (3 - Td or Tdap) 2021    ANNUAL PHYSICAL  Never done    INFLUENZA VACCINE  2024    COVID-19 Vaccine (3 - - season) 2024    BMI  FOLLOWUP  01/16/2025        Current Outpatient Medications on File Prior to Visit   Medication Sig    diclofenac (VOLTAREN) 75 MG EC tablet Take 1 tablet by mouth.    losartan (Cozaar) 25 MG tablet Take 1 tablet by mouth Daily.    methylPREDNISolone (MEDROL) 4 MG dose pack Take as directed on package instructions, with food.    propranolol XL (INNOPRAN XL) 120 MG 24 hr capsule Take 1 capsule by mouth 2 (Two) Times a Day.    [DISCONTINUED] sertraline (ZOLOFT) 50 MG tablet TAKE 1 TABLET BY MOUTH DAILY    [DISCONTINUED] propranolol LA (INDERAL LA) 120 MG 24 hr capsule  (Patient not taking: Reported on 2/11/2025)     No current facility-administered medications on file prior to visit.       Immunization History   Administered Date(s) Administered    COVID-19 (PFIZER) Purple Cap Monovalent 02/24/2021, 03/26/2021    DT 07/12/2004    DTP 04/12/1993, 06/09/1993, 03/30/1994, 01/17/1997    FluMist 2-49yrs (Nasal) 10/19/2012, 10/31/2013    HPV, Unspecified 05/22/2003, 05/22/2003, 07/30/2003, 07/30/2003, 12/01/2003, 12/01/2003    Hep B, Adolescent or Pediatric 02/25/1993, 02/25/1993, 01/05/1994    Hepatitis A 04/25/2018, 11/01/2018    Hib (PRP-OMP) 04/12/1993, 06/09/1993, 12/25/1993, 03/30/1994    Influenza, Unspecified 12/19/2020    MMR 03/30/1994, 01/17/1997    Meningococcal MCV4P (Menactra) 06/06/2008    OPV 01/01/1993, 02/25/1993, 04/12/1993, 03/30/1994    Td (TDVAX) 07/12/2004    Tdap 05/13/2011    Varicella 01/17/1997       Review of Systems   Constitutional:  Positive for fatigue. Negative for fever.   Respiratory:  Negative for cough and shortness of breath.    Cardiovascular:  Negative for chest pain, palpitations and leg swelling.   Genitourinary:  Positive for erectile dysfunction (no loss of libido).   Musculoskeletal:  Positive for back pain (taking steroid pack now).        Objective     Vitals:    02/11/25 1525 02/11/25 1542   BP: 165/90 170/82   BP Location: Right arm Right arm   Patient Position: Sitting  "Sitting   Cuff Size: Large Adult Adult   Pulse: 50    Temp: 98 °F (36.7 °C)    TempSrc: Oral    SpO2: 97%    Weight: 134 kg (296 lb 6.4 oz)    Height: 175.3 cm (69\")             Physical Exam  Vitals reviewed.   Constitutional:       General: He is not in acute distress.     Appearance: Normal appearance. He is obese.   Neck:      Vascular: No carotid bruit.   Cardiovascular:      Rate and Rhythm: Normal rate and regular rhythm.   Pulmonary:      Effort: Pulmonary effort is normal. No respiratory distress.      Breath sounds: Normal breath sounds.   Musculoskeletal:      Right lower leg: No edema.      Left lower leg: No edema.   Neurological:      Mental Status: He is alert.   Psychiatric:         Mood and Affect: Mood normal.         Behavior: Behavior normal.         Result Review :     The following data was reviewed by: FANY Thakkar on 02/11/2025:                       Assessment and Plan      Diagnoses and all orders for this visit:    1. Essential hypertension (Primary)  Assessment & Plan:  Repeat BP still up, to continue to monitor his bp and follow up with cardiology 2-21-25, continue his rx and will get some labs, he will return fasting     Orders:  -     Comprehensive metabolic panel; Future  -     Lipid panel; Future    2. Mixed anxiety and depressive disorder  Assessment & Plan:  Restart zoloft, follow up if not improving     Orders:  -     sertraline (ZOLOFT) 50 MG tablet; Take 1 tablet by mouth Daily.  Dispense: 90 tablet; Refill: 0    3. Elevated LFTs  Assessment & Plan:  To reduce or eliminate alcohol, reviewed last labs, he will return for CMP     Orders:  -     Comprehensive metabolic panel; Future    4. Arthralgia, unspecified joint  Assessment & Plan:  He was seen elsewhere for back pain and given 2 rx, he is just taking the steroid now, and has the diclofenac, advised to stay off for now       5. ARLENE (obstructive sleep apnea)  Assessment & Plan:  He continues to use his Cpap " nightly       6. Other fatigue  Assessment & Plan:  Checking some labs     Orders:  -     Cancel: CBC w AUTO Differential; Future  -     Cancel: TSH Rfx On Abnormal To Free T4  -     CBC w AUTO Differential; Future  -     TSH Rfx On Abnormal To Free T4; Future    7. Other male erectile dysfunction  Assessment & Plan:  Will check some labs     Orders:  -     Testosterone, Free, Total; Future        Class 3 Severe Obesity (BMI >=40). Obesity-related health conditions include the following: obstructive sleep apnea and hypertension. Obesity is worsening. BMI is is above average; BMI management plan is completed. We discussed portion control and increasing exercise.           Follow Up     Return if symptoms worsen or fail to improve, for followup pending lab results, fasting for labs.    Patient was given instructions and counseling regarding his condition or for health maintenance advice. Please see specific information pulled into the AVS if appropriate.

## 2025-02-13 ENCOUNTER — LAB (OUTPATIENT)
Dept: LAB | Facility: HOSPITAL | Age: 35
End: 2025-02-13
Payer: COMMERCIAL

## 2025-02-13 DIAGNOSIS — N52.8 OTHER MALE ERECTILE DYSFUNCTION: ICD-10-CM

## 2025-02-13 DIAGNOSIS — I10 ESSENTIAL HYPERTENSION: ICD-10-CM

## 2025-02-13 DIAGNOSIS — R79.89 ELEVATED LFTS: ICD-10-CM

## 2025-02-13 DIAGNOSIS — R53.83 OTHER FATIGUE: ICD-10-CM

## 2025-02-13 LAB
ALBUMIN SERPL-MCNC: 4.7 G/DL (ref 3.5–5.2)
ALBUMIN/GLOB SERPL: 1.7 G/DL
ALP SERPL-CCNC: 63 U/L (ref 39–117)
ALT SERPL W P-5'-P-CCNC: 89 U/L (ref 1–41)
ANION GAP SERPL CALCULATED.3IONS-SCNC: 11 MMOL/L (ref 5–15)
AST SERPL-CCNC: 36 U/L (ref 1–40)
BASOPHILS # BLD AUTO: 0.08 10*3/MM3 (ref 0–0.2)
BASOPHILS NFR BLD AUTO: 0.7 % (ref 0–1.5)
BILIRUB SERPL-MCNC: 1.1 MG/DL (ref 0–1.2)
BUN SERPL-MCNC: 17 MG/DL (ref 6–20)
BUN/CREAT SERPL: 15.7 (ref 7–25)
CALCIUM SPEC-SCNC: 9.4 MG/DL (ref 8.6–10.5)
CHLORIDE SERPL-SCNC: 103 MMOL/L (ref 98–107)
CHOLEST SERPL-MCNC: 115 MG/DL (ref 0–200)
CO2 SERPL-SCNC: 25 MMOL/L (ref 22–29)
CREAT SERPL-MCNC: 1.08 MG/DL (ref 0.76–1.27)
DEPRECATED RDW RBC AUTO: 40.5 FL (ref 37–54)
EGFRCR SERPLBLD CKD-EPI 2021: 93.5 ML/MIN/1.73
EOSINOPHIL # BLD AUTO: 0.09 10*3/MM3 (ref 0–0.4)
EOSINOPHIL NFR BLD AUTO: 0.8 % (ref 0.3–6.2)
ERYTHROCYTE [DISTWIDTH] IN BLOOD BY AUTOMATED COUNT: 12.3 % (ref 12.3–15.4)
GLOBULIN UR ELPH-MCNC: 2.8 GM/DL
GLUCOSE SERPL-MCNC: 80 MG/DL (ref 65–99)
HCT VFR BLD AUTO: 50.3 % (ref 37.5–51)
HDLC SERPL-MCNC: 41 MG/DL (ref 40–60)
HGB BLD-MCNC: 17.2 G/DL (ref 13–17.7)
IMM GRANULOCYTES # BLD AUTO: 0.02 10*3/MM3 (ref 0–0.05)
IMM GRANULOCYTES NFR BLD AUTO: 0.2 % (ref 0–0.5)
LDLC SERPL CALC-MCNC: 62 MG/DL (ref 0–100)
LDLC/HDLC SERPL: 1.56 {RATIO}
LYMPHOCYTES # BLD AUTO: 2.15 10*3/MM3 (ref 0.7–3.1)
LYMPHOCYTES NFR BLD AUTO: 18.4 % (ref 19.6–45.3)
MCH RBC QN AUTO: 30.2 PG (ref 26.6–33)
MCHC RBC AUTO-ENTMCNC: 34.2 G/DL (ref 31.5–35.7)
MCV RBC AUTO: 88.2 FL (ref 79–97)
MONOCYTES # BLD AUTO: 0.89 10*3/MM3 (ref 0.1–0.9)
MONOCYTES NFR BLD AUTO: 7.6 % (ref 5–12)
NEUTROPHILS NFR BLD AUTO: 72.3 % (ref 42.7–76)
NEUTROPHILS NFR BLD AUTO: 8.48 10*3/MM3 (ref 1.7–7)
PLATELET # BLD AUTO: 256 10*3/MM3 (ref 140–450)
PMV BLD AUTO: 11.4 FL (ref 6–12)
POTASSIUM SERPL-SCNC: 4.2 MMOL/L (ref 3.5–5.2)
PROT SERPL-MCNC: 7.5 G/DL (ref 6–8.5)
RBC # BLD AUTO: 5.7 10*6/MM3 (ref 4.14–5.8)
SODIUM SERPL-SCNC: 139 MMOL/L (ref 136–145)
TRIGL SERPL-MCNC: 51 MG/DL (ref 0–150)
TSH SERPL DL<=0.05 MIU/L-ACNC: 1.79 UIU/ML (ref 0.27–4.2)
VLDLC SERPL-MCNC: 12 MG/DL (ref 5–40)
WBC NRBC COR # BLD AUTO: 11.71 10*3/MM3 (ref 3.4–10.8)

## 2025-02-13 PROCEDURE — 36415 COLL VENOUS BLD VENIPUNCTURE: CPT

## 2025-02-13 PROCEDURE — 84403 ASSAY OF TOTAL TESTOSTERONE: CPT

## 2025-02-13 PROCEDURE — 84402 ASSAY OF FREE TESTOSTERONE: CPT

## 2025-02-13 PROCEDURE — 80050 GENERAL HEALTH PANEL: CPT

## 2025-02-13 PROCEDURE — 80061 LIPID PANEL: CPT

## 2025-02-20 LAB
TESTOST FREE SERPL-MCNC: 8.1 PG/ML (ref 8.7–25.1)
TESTOST SERPL-MCNC: 249 NG/DL (ref 264–916)

## 2025-02-21 ENCOUNTER — OFFICE VISIT (OUTPATIENT)
Dept: CARDIOLOGY | Facility: CLINIC | Age: 35
End: 2025-02-21
Payer: COMMERCIAL

## 2025-02-21 ENCOUNTER — HOSPITAL ENCOUNTER (OUTPATIENT)
Dept: CARDIOLOGY | Facility: HOSPITAL | Age: 35
Discharge: HOME | End: 2025-02-21
Payer: COMMERCIAL

## 2025-02-21 ENCOUNTER — TELEPHONE (OUTPATIENT)
Dept: CARDIOLOGY | Facility: CLINIC | Age: 35
End: 2025-02-21
Payer: COMMERCIAL

## 2025-02-21 VITALS
BODY MASS INDEX: 43.84 KG/M2 | WEIGHT: 296 LBS | SYSTOLIC BLOOD PRESSURE: 134 MMHG | HEART RATE: 50 BPM | HEIGHT: 69 IN | OXYGEN SATURATION: 98 % | DIASTOLIC BLOOD PRESSURE: 82 MMHG

## 2025-02-21 VITALS
BODY MASS INDEX: 44.17 KG/M2 | HEART RATE: 47 BPM | WEIGHT: 298.2 LBS | SYSTOLIC BLOOD PRESSURE: 132 MMHG | HEIGHT: 69 IN | DIASTOLIC BLOOD PRESSURE: 88 MMHG

## 2025-02-21 DIAGNOSIS — I35.1 NONRHEUMATIC AORTIC VALVE INSUFFICIENCY: ICD-10-CM

## 2025-02-21 DIAGNOSIS — I35.1 NONRHEUMATIC AORTIC VALVE INSUFFICIENCY: Primary | ICD-10-CM

## 2025-02-21 DIAGNOSIS — I10 ESSENTIAL HYPERTENSION: ICD-10-CM

## 2025-02-21 DIAGNOSIS — G47.33 OSA (OBSTRUCTIVE SLEEP APNEA): ICD-10-CM

## 2025-02-21 LAB
AORTIC ARCH: 2.5 CM
ASCENDING AORTA: 2.9 CM
AV MEAN PRESS GRAD SYS DOP V1V2: 8 MMHG
AV VMAX SYS DOP: 198 CM/SEC
BH CV ECHO MEAS - ACS: 1.98 CM
BH CV ECHO MEAS - AI P1/2T: 807.8 MSEC
BH CV ECHO MEAS - AO MAX PG: 15.7 MMHG
BH CV ECHO MEAS - AO ROOT DIAM: 2.6 CM
BH CV ECHO MEAS - AO V2 VTI: 45.5 CM
BH CV ECHO MEAS - AVA(I,D): 2.05 CM2
BH CV ECHO MEAS - EDV(CUBED): 210.6 ML
BH CV ECHO MEAS - EDV(MOD-SP2): 150 ML
BH CV ECHO MEAS - EDV(MOD-SP4): 162 ML
BH CV ECHO MEAS - EF(MOD-SP2): 66.7 %
BH CV ECHO MEAS - EF(MOD-SP4): 65.4 %
BH CV ECHO MEAS - ESV(CUBED): 72.2 ML
BH CV ECHO MEAS - ESV(MOD-SP2): 50 ML
BH CV ECHO MEAS - ESV(MOD-SP4): 56 ML
BH CV ECHO MEAS - FS: 30 %
BH CV ECHO MEAS - IVS/LVPW: 1.1 CM
BH CV ECHO MEAS - IVSD: 1.34 CM
BH CV ECHO MEAS - LAT PEAK E' VEL: 14.6 CM/SEC
BH CV ECHO MEAS - LV DIASTOLIC VOL/BSA (35-75): 66.4 CM2
BH CV ECHO MEAS - LV MASS(C)D: 337.2 GRAMS
BH CV ECHO MEAS - LV MAX PG: 4.8 MMHG
BH CV ECHO MEAS - LV MEAN PG: 2 MMHG
BH CV ECHO MEAS - LV SYSTOLIC VOL/BSA (12-30): 22.9 CM2
BH CV ECHO MEAS - LV V1 MAX: 110 CM/SEC
BH CV ECHO MEAS - LV V1 VTI: 24.4 CM
BH CV ECHO MEAS - LVIDD: 5.9 CM
BH CV ECHO MEAS - LVIDS: 4.2 CM
BH CV ECHO MEAS - LVOT AREA: 3.8 CM2
BH CV ECHO MEAS - LVOT DIAM: 2.21 CM
BH CV ECHO MEAS - LVPWD: 1.21 CM
BH CV ECHO MEAS - MED PEAK E' VEL: 9.8 CM/SEC
BH CV ECHO MEAS - MR MAX PG: 23.1 MMHG
BH CV ECHO MEAS - MR MAX VEL: 240.3 CM/SEC
BH CV ECHO MEAS - MV A DUR: 0.11 SEC
BH CV ECHO MEAS - MV A MAX VEL: 36.6 CM/SEC
BH CV ECHO MEAS - MV DEC SLOPE: 215.8 CM/SEC2
BH CV ECHO MEAS - MV DEC TIME: 0.16 SEC
BH CV ECHO MEAS - MV E MAX VEL: 90.8 CM/SEC
BH CV ECHO MEAS - MV E/A: 2.48
BH CV ECHO MEAS - MV MAX PG: 5 MMHG
BH CV ECHO MEAS - MV MEAN PG: 1.33 MMHG
BH CV ECHO MEAS - MV P1/2T: 138.2 MSEC
BH CV ECHO MEAS - MV V2 VTI: 40.8 CM
BH CV ECHO MEAS - MVA(P1/2T): 1.59 CM2
BH CV ECHO MEAS - MVA(VTI): 2.29 CM2
BH CV ECHO MEAS - PA ACC TIME: 0.12 SEC
BH CV ECHO MEAS - PA V2 MAX: 115.4 CM/SEC
BH CV ECHO MEAS - PULM A REVS DUR: 0.11 SEC
BH CV ECHO MEAS - PULM A REVS VEL: 23.6 CM/SEC
BH CV ECHO MEAS - PULM DIAS VEL: 67.2 CM/SEC
BH CV ECHO MEAS - PULM S/D: 0.69
BH CV ECHO MEAS - PULM SYS VEL: 46 CM/SEC
BH CV ECHO MEAS - QP/QS: 0.66
BH CV ECHO MEAS - RAP SYSTOLE: 3 MMHG
BH CV ECHO MEAS - RV MAX PG: 2.21 MMHG
BH CV ECHO MEAS - RV V1 MAX: 74.4 CM/SEC
BH CV ECHO MEAS - RV V1 VTI: 18 CM
BH CV ECHO MEAS - RVOT DIAM: 2.09 CM
BH CV ECHO MEAS - RVSP: 15.5 MMHG
BH CV ECHO MEAS - SUP REN AO DIAM: 2.9 CM
BH CV ECHO MEAS - SV(LVOT): 93.4 ML
BH CV ECHO MEAS - SV(MOD-SP2): 100 ML
BH CV ECHO MEAS - SV(MOD-SP4): 106 ML
BH CV ECHO MEAS - SV(RVOT): 62.1 ML
BH CV ECHO MEAS - SVI(LVOT): 38.3 ML/M2
BH CV ECHO MEAS - SVI(MOD-SP2): 41 ML/M2
BH CV ECHO MEAS - SVI(MOD-SP4): 43.4 ML/M2
BH CV ECHO MEAS - TAPSE (>1.6): 2.14 CM
BH CV ECHO MEAS - TR MAX PG: 12.5 MMHG
BH CV ECHO MEAS - TR MAX VEL: 176.5 CM/SEC
BH CV ECHO MEASUREMENTS AVERAGE E/E' RATIO: 7.44
BH CV XLRA - RV BASE: 3.2 CM
BH CV XLRA - RV LENGTH: 9.1 CM
BH CV XLRA - RV MID: 2.8 CM
BH CV XLRA - TDI S': 11.2 CM/SEC
LEFT ATRIUM VOLUME INDEX: 26.8 ML/M2
LV EF BIPLANE MOD: 65.7 %
NT-PROBNP SERPL-MCNC: 62.7 PG/ML (ref 0–450)
SINUS: 2.7 CM
STJ: 2.6 CM

## 2025-02-21 PROCEDURE — 25510000001 PERFLUTREN 6.52 MG/ML SUSPENSION 2 ML VIAL: Performed by: INTERNAL MEDICINE

## 2025-02-21 PROCEDURE — 83880 ASSAY OF NATRIURETIC PEPTIDE: CPT | Performed by: INTERNAL MEDICINE

## 2025-02-21 PROCEDURE — 99214 OFFICE O/P EST MOD 30 MIN: CPT | Performed by: INTERNAL MEDICINE

## 2025-02-21 PROCEDURE — 93306 TTE W/DOPPLER COMPLETE: CPT

## 2025-02-21 PROCEDURE — 93000 ELECTROCARDIOGRAM COMPLETE: CPT | Performed by: INTERNAL MEDICINE

## 2025-02-21 PROCEDURE — 36415 COLL VENOUS BLD VENIPUNCTURE: CPT

## 2025-02-21 RX ADMIN — PERFLUTREN 2 ML: 6.52 INJECTION, SUSPENSION INTRAVENOUS at 10:16

## 2025-02-21 NOTE — PROGRESS NOTES
Date of Office Visit: 2025  Encounter Provider: Charlene Clark MD  Place of Service: [facility]  Patient Name: Inderjit Lala  :1990    Chief complaint  Aortic regurgitation    History of Present Illness  Patient is a 34-year-old gentleman with hypertension and aortic valve disease.  At a sports physical age 16 he was noted to have aortic regurgitation of uncertain degree.  Treadmill test was unremarkable.  He is been followed by serial annual echocardiograms since then.  On 2022 EKG showed sinus bradycardia echo was performed at that time but results are unavailable.  MR angiogram of the chest on 2024 showed no evidence of thoracic aortic aneurysm or dissection there was no mention of coarctation..  Echocardiogram 2024 showed an ejection fraction 68% with normal left ventricular cavity size, normal diastolic function, normal RV size and function normal atrial size with sclerotic aortic valve.  Leaflet number cannot be determined.  There was moderate to severe aortic regurgitation.  Echo today (2025) with mild LV dilatation EF 66%, normal diastolic function, likely moderate aortic valve regurgitation.  Trivial valve regurgitation of the tricuspid and mitral valve with normal RVSP.      Since last visit he has had no chest pain shortness of breath palpitations syncope near syncope.  He is active walking at work.  Currently taking losartan 50 mg a day as changed by his PCP after blood pressure last week was in the 160s.  Since then blood pressure has been in the low 130s to 140 mmHg.  He believes he is not very strict about his diet.  He walks extensively at work but does note scheduled exercise.  He is using CPAP consistently.    Past Medical History:   Diagnosis Date    Anxiety     Aortic regurgitation     Aortic valve disorder     Aortic valve insufficiency     Chicken pox     COVID-19     Epididymitis     Hypertension      Past Surgical History:    Procedure Laterality Date    EAR TUBES      pressure equalization tubes 1994 and 1998    KNEE ARTHROSCOPY Left 2009    VASECTOMY  05/2019    WISDOM TOOTH EXTRACTION       Outpatient Medications Prior to Visit   Medication Sig Dispense Refill    diclofenac (VOLTAREN) 75 MG EC tablet Take 1 tablet by mouth.      losartan (Cozaar) 25 MG tablet Take 1 tablet by mouth Daily. (Patient taking differently: Take 2 tablets by mouth Daily.) 90 tablet 3    propranolol XL (INNOPRAN XL) 120 MG 24 hr capsule Take 1 capsule by mouth 2 (Two) Times a Day. 180 capsule 3    sertraline (ZOLOFT) 50 MG tablet Take 1 tablet by mouth Daily. 90 tablet 0    methylPREDNISolone (MEDROL) 4 MG dose pack Take as directed on package instructions, with food. (Patient not taking: Reported on 2/21/2025) 21 tablet 0     No facility-administered medications prior to visit.       Allergies as of 02/21/2025    (No Known Allergies)     Social History     Socioeconomic History    Marital status:     Number of children: 2   Tobacco Use    Smoking status: Never    Smokeless tobacco: Never   Vaping Use    Vaping status: Never Used   Substance and Sexual Activity    Alcohol use: Yes     Comment: rare     Family History   Problem Relation Age of Onset    Hypertension Mother     Anxiety disorder Mother     Depression Mother     Arrhythmia Father         afib    Heart failure Father     Hypertension Father     Hyperlipidemia Father     Diabetes type II Father     Hypertension Sister     Hypertension Brother     Heart failure Maternal Grandmother     Diabetes type II Paternal Grandmother      Review of Systems   Constitutional: Negative for chills, fever, weight gain and weight loss.   Cardiovascular:  Negative for leg swelling.   Respiratory:  Negative for cough, snoring and wheezing.    Hematologic/Lymphatic: Negative for bleeding problem. Does not bruise/bleed easily.   Skin:  Negative for color change.   Musculoskeletal:  Negative for falls, joint pain  "and myalgias.   Gastrointestinal:  Negative for melena.   Genitourinary:  Negative for hematuria.   Neurological:  Negative for excessive daytime sleepiness.   Psychiatric/Behavioral:  Negative for depression. The patient is not nervous/anxious.         Objective:     Vitals:    02/21/25 1011   BP: 132/88   BP Location: Right arm   Patient Position: Sitting   Cuff Size: Large Adult   Pulse: (!) 47   Weight: 135 kg (298 lb 3.2 oz)   Height: 175.3 cm (69\")     Body mass index is 44.04 kg/m².    Vitals reviewed.   Constitutional:       Appearance: Well-developed.   Eyes:      General: No scleral icterus.        Right eye: No discharge.      Conjunctiva/sclera: Conjunctivae normal.      Pupils: Pupils are equal, round, and reactive to light.   HENT:      Head: Normocephalic.      Nose: Nose normal.   Neck:      Thyroid: No thyromegaly.      Vascular: No JVD.   Pulmonary:      Effort: Pulmonary effort is normal. No respiratory distress.      Breath sounds: Normal breath sounds. No wheezing. No rales.   Cardiovascular:      Normal rate. Regular rhythm. Normal S1. Normal S2.       Murmurs: There is a grade 1/4 diastolic murmur.      No gallop.    Pulses:     Intact distal pulses.      Carotid: 2+ bilaterally.     Radial: 2+ bilaterally.     Femoral: 2+ bilaterally.     Popliteal: 2+ bilaterally.     Dorsalis pedis: 2+ bilaterally.     Posterior tibial: 2+ bilaterally.  Edema:     Peripheral edema absent.   Abdominal:      General: Bowel sounds are normal. There is no distension.      Palpations: Abdomen is soft.      Tenderness: There is no abdominal tenderness. There is no rebound.   Musculoskeletal: Normal range of motion.         General: No tenderness.      Cervical back: Normal range of motion and neck supple. Skin:     General: Skin is warm and dry.      Findings: No erythema or rash.   Neurological:      Mental Status: Alert and oriented to person, place, and time.   Psychiatric:         Behavior: Behavior normal.   "       Thought Content: Thought content normal.         Judgment: Judgment normal.       Lab Review:   Lab Results - Last 18 Months   Lab Units 02/13/25  1512 01/17/24  1008   WBC 10*3/mm3 11.71* 6.80   RBC 10*6/mm3 5.70 5.47   HEMOGLOBIN g/dL 17.2 16.6   HEMATOCRIT % 50.3 48.5   MCV fL 88.2 88.7   MCH pg 30.2 30.3   MCHC g/dL 34.2 34.2   RDW % 12.3 12.7   PLATELETS 10*3/mm3 256 216   NEUTROPHIL % % 72.3 59.3   LYMPHOCYTE % % 18.4* 28.8   MONOCYTES % % 7.6 8.5   EOSINOPHIL % % 0.8 2.1   BASOPHIL % % 0.7 1.0   NEUTROS ABS 10*3/mm3 8.48* 4.03   LYMPHS ABS 10*3/mm3 2.15 1.96   MONOS ABS 10*3/mm3 0.89 0.58   EOS ABS 10*3/mm3 0.09 0.14   BASOS ABS 10*3/mm3 0.08 0.07   RDW-SD fl 40.5 41.3   MPV fL 11.4 11.0       Lab Results - Last 18 Months   Lab Units 02/13/25  1512 12/03/24  1516   GLUCOSE mg/dL 80 84   BUN mg/dL 17 22*   CREATININE mg/dL 1.08 1.19   SODIUM mmol/L 139 140   POTASSIUM mmol/L 4.2 4.2   CHLORIDE mmol/L 103 103   CO2 mmol/L 25.0 27.0   CALCIUM mg/dL 9.4 9.4   TOTAL PROTEIN g/dL 7.5 7.4   ALBUMIN g/dL 4.7 4.6   ALT (SGPT) U/L 89* 63*   AST (SGOT) U/L 36 33   ALK PHOS U/L 63 63   BILIRUBIN mg/dL 1.1 0.6   GLOBULIN gm/dL 2.8 2.8   A/G RATIO g/dL 1.7 1.6   BUN / CREAT RATIO  15.7 18.5   ANION GAP mmol/L 11.0 10.0   EGFR mL/min/1.73 93.5 83.8     Lab Results - Last 18 Months   Lab Units 02/13/25  1512 01/17/24  1008   CHOLESTEROL mg/dL 115 136   TRIGLYCERIDES mg/dL 51 60   HDL CHOL mg/dL 41 41   LDL CHOL mg/dL 62 82   VLDL CHOL mg/dL 12 13   LDL/HDL RATIO  1.56 2.02     Lab Results - Last 18 Months   Lab Units 02/21/25  1143 12/03/24  1516   PROBNP pg/mL 62.7 <36.0       Lab Results - Last 18 Months   Lab Units 02/13/25  1512 01/17/24  1008   TSH uIU/mL 1.790 2.210       ECG 12 Lead    Date/Time: 2/21/2025 11:38 AM  Performed by: Charlene Clark MD    Authorized by: Charlene Clark MD  Comparison: compared with previous ECG   Similar to previous ECG  Rhythm: sinus bradycardia  Other findings: early  repolarization    Clinical impression: abnormal EKG        Assessment:       Diagnosis Plan   1. Nonrheumatic aortic valve insufficiency  proBNP    ECG 12 Lead      2. Essential hypertension  ECG 12 Lead      3. ARLENE (obstructive sleep apnea)          Plan:       1.  Aortic valve disease with moderate to severe aortic regurgitation.  He remains asymptomatic.  LV cavity size was not increased.  Echo today technically difficult but regurgitation appears stable and questionably better.  Mildly dilated.  Needs better blood pressure control.  Will recheck a proBNP.  If this is normal plan on follow-up in 6 months and repeat echo in 1 year possibly stress echo.  2.  Hypertension.  With him that he would aim for goal blood pressure in the 1 teens to low 120s.  He believes he has room to work on a low-salt diet and prefers to do this prior to increasing medical regimen further.  He will call next week with additional readings.  He is fairly active at work on  4.  Elevated liver function tests.  Recommendations per Dr Acosta.   5.  Sinus bradycardia. On propranolol and he remains asymptomatic.  As he has done well on this dose of propranolol will defer dose reduction.  They will continue to monitor heart rate at home.  Typically it has been in the 50s.  Likely getting more rates in the 40s will place a 24 Holter  6.  Obstructive sleep apnea, on CPAP therapy.  7.  Migraine headaches.  Controlled on propranolol and with use of CPAP.  8.  Abnormal chronic EKG changes.He has no symptoms of angina or heart failure at this time.      Time Spent: I spent 35 minutes caring for Inderjit on this date of service. This time includes time spent by me in the following activities: preparing for the visit, reviewing tests, obtaining and/or reviewing a separately obtained history, performing a medically appropriate examination and/or evaluation, counseling and educating the patient/family/caregiver, ordering medications, tests, or procedures,  documenting information in the medical record, and independently interpreting results and communicating that information with the patient/family/caregiver.   I spent 3 minutes on the separately reported service of ECG and Echo. This time is not included in the time used to support the E/M service also reported today.        Your medication list            Accurate as of February 21, 2025 11:59 PM. If you have any questions, ask your nurse or doctor.                CHANGE how you take these medications        Instructions Last Dose Given Next Dose Due   losartan 25 MG tablet  Commonly known as: Cozaar  What changed: how much to take      Take 1 tablet by mouth Daily.              CONTINUE taking these medications        Instructions Last Dose Given Next Dose Due   diclofenac 75 MG EC tablet  Commonly known as: VOLTAREN      Take 1 tablet by mouth.       methylPREDNISolone 4 MG dose pack  Commonly known as: MEDROL      Take as directed on package instructions, with food.       propranolol  MG 24 hr capsule  Commonly known as: INNOPRAN XL      Take 1 capsule by mouth 2 (Two) Times a Day.       sertraline 50 MG tablet  Commonly known as: ZOLOFT      Take 1 tablet by mouth Daily.                Patient is no longer taking -.  I corrected the med list to reflect this.  I did not stop these medications.      Dictated utilizing Dragon dictation

## 2025-02-24 NOTE — TELEPHONE ENCOUNTER
Called and left VM, will continue to try to reach pt.    HUB- please put patient straight through to triage    Iraida Adam, RN  Triage RN  02/24/25 09:41 EST

## 2025-04-04 RX ORDER — LOSARTAN POTASSIUM 25 MG/1
25 TABLET ORAL DAILY
Qty: 90 TABLET | Refills: 1 | Status: SHIPPED | OUTPATIENT
Start: 2025-04-04

## 2025-04-04 NOTE — TELEPHONE ENCOUNTER
Caller: DALTON MIRELES    Relationship: Mother    Best call back number:     Requested Prescriptions:   Requested Prescriptions     Pending Prescriptions Disp Refills    losartan (Cozaar) 25 MG tablet 90 tablet 3     Sig: Take 1 tablet by mouth Daily.        Pharmacy where request should be sent: [facility] [id number] [place], [place] - [address] [facility] - [phone]  - [phone]     Last office visit with prescribing clinician: 2/21/2025   Last telemedicine visit with prescribing clinician: Visit date not found   Next office visit with prescribing clinician: Visit date not found     Additional details provided by patient:      PT TAKES 25 MG  2 TIMES DAILY       Does the patient have less than a 3 day supply:  [x] Yes  [] No    Would you like a call back once the refill request has been completed: [] Yes [x] No    If the office needs to give you a call back, can they leave a voicemail: [x] Yes [] No    Isaiah Kauffman Rep   04/04/25 15:58 EDT

## 2025-04-11 ENCOUNTER — TELEPHONE (OUTPATIENT)
Dept: CARDIOLOGY | Age: 35
End: 2025-04-11
Payer: COMMERCIAL

## 2025-04-11 NOTE — TELEPHONE ENCOUNTER
Pt's mom called re:  Refill needed for Losartan 50mg BID.  Advised her that we do not have documented that we increased the Losartan.    Called pt and LMM re: Pt needs to get from PCP or verify dose Losartan.  We only have him taking Losartan 50mg QD.  Whomever increases, needs to fill.

## 2025-05-09 DIAGNOSIS — F41.8 MIXED ANXIETY AND DEPRESSIVE DISORDER: ICD-10-CM

## 2025-05-12 DIAGNOSIS — F41.8 MIXED ANXIETY AND DEPRESSIVE DISORDER: ICD-10-CM

## 2025-05-21 ENCOUNTER — TELEPHONE (OUTPATIENT)
Dept: FAMILY MEDICINE CLINIC | Age: 35
End: 2025-05-21
Payer: COMMERCIAL

## 2025-05-21 NOTE — TELEPHONE ENCOUNTER
At cardiology in 2-2025 bp:     132/88     Verify that is the dose of losartan he is taking, what his bp has been running, then I can refill, he needs to make sure his cardiologist know what dose he is taking,

## 2025-05-21 NOTE — TELEPHONE ENCOUNTER
Caller: Inderjit Lala    Relationship: Self    Best call back number: [phone]    What medication are you requesting: losartan (Cozaar) 25 MG tablet     What are your current symptoms: N/A    How long have you been experiencing symptoms: N/A    Have you had these symptoms before:    [] Yes  [] No    Have you been treated for these symptoms before:   [] Yes  [] No    If a prescription is needed, what is your preferred pharmacy and phone number: [facility] [id number] - [place], [place] - [facility] [address] [facility] [phone] [facility] [phone] FX     Additional notes: AT HIS LAST APPOINTMENT, HE WAS INCREASED TO TAKING THIS MEDICATION TWICE A DAY. HIS PRESCRIPTION HAS RUN OUT EARLY. PLEASE SEND NEW PRESCRIPTION FOR TWICE A DAY TO PHARMACY [facility] AS PATIENT HAS ONE MORE DAY OF MEDICATION LEFT.

## 2025-05-21 NOTE — TELEPHONE ENCOUNTER
You have been taking Losartan 25mg two daily?  Have you been checking your blood pressure at home? Can you give us some of your readings?       OK FOR HUB TO RELAY

## 2025-05-21 NOTE — TELEPHONE ENCOUNTER
Called pt and told him that he would need to talk to his cardiologist Dr Clark, he prescribes the losartan.  Pt said when he was in to see A Acosta last she was the one who told him to increase it to two of the 25mg a day.  He called the cardiologist office and they told him if A Acosta changed it he would have to get the new prescription from her.     02/11/25 office note with A Acosta it has:    1. Essential hypertension (Primary)  Assessment & Plan:  Repeat BP still up, to continue to monitor his bp and follow up with cardiology 2-21-25, continue his rx and will get some labs, he will return fasting

## 2025-05-22 RX ORDER — LOSARTAN POTASSIUM 50 MG/1
50 TABLET ORAL DAILY
Qty: 90 TABLET | Refills: 0 | Status: SHIPPED | OUTPATIENT
Start: 2025-05-22

## 2025-05-22 NOTE — TELEPHONE ENCOUNTER
Patient states that he has been taking 2 of the 25 mg losartan since his LOV with PCP 2/2025. Looks like Cardio is the one who manages his BP meds.There is no documentation in patients chart that neither PCP or Cardio . Patient  was advised to contact his cardio office for refill or dose change

## 2025-05-22 NOTE — TELEPHONE ENCOUNTER
Caller: Inderjit Lala    Relationship: Self    Best call back number: [phone]    Requested Prescriptions:   Requested Prescriptions     Pending Prescriptions Disp Refills    losartan (Cozaar) 25 MG tablet 90 tablet 1     Sig: Take 1 tablet by mouth Daily.        Pharmacy where request should be sent:    [facility] [id number] -  [place] - [address] [facility] - [phone]  - [phone] FX    Last office visit with prescribing clinician: 2/21/2025   Last telemedicine visit with prescribing clinician: Visit date not found   Next office visit with prescribing clinician: Visit date not found     Additional details provided by patient: SAYS LAST VISIT IT WAS INCREASED TO 2 TABLETS PER DAY, ITS STILL SHOWING ONCE A DAY IN THE SYSTEM PLEASE CHANGE IF THIS IS THE CASE.     Does the patient have less than a 3 day supply:  [x] Yes  [] No        Isaiah Oconnell Rep   05/22/25 14:54 EDT

## 2025-05-22 NOTE — TELEPHONE ENCOUNTER
LMTR[facility]       LOV 2/2025    Essential hypertension (Primary)  Assessment & Plan:  Repeat BP still up, to continue to monitor his bp and follow up with cardiology 2-21-25, continue his rx and will get some labs, he will return fasting

## 2025-05-22 NOTE — TELEPHONE ENCOUNTER
Pt stated that he has been taking Losartan 50mg QD since he saw his PCP.  There was zero documentation to back this up.  Pt stated that his BP is running about 120s/70s-80s.      Reviewed with [facility] Bro and ok to fill at 50mg QD BUT pt MUST keep his followup or no more.  Refill sent.

## 2025-05-29 ENCOUNTER — TELEPHONE (OUTPATIENT)
Dept: CARDIOLOGY | Age: 35
End: 2025-05-29

## 2025-05-29 DIAGNOSIS — R79.89 LOW TESTOSTERONE IN MALE: Primary | ICD-10-CM

## 2025-05-29 DIAGNOSIS — R79.89 ELEVATED LFTS: ICD-10-CM

## 2025-05-29 NOTE — TELEPHONE ENCOUNTER
"  Caller: Inderjit Lala    Relationship: Self    Best call back number: [phone]    What is the best time to reach you: ANY    Who are you requesting to speak with (clinical staff, provider,  specific staff member): CLINICAL     What was the call regarding: PT WANTS TO KNOW IF  WILL PRESCRIBE HIM \"ONE OF THE WEIGHT LOSS SHOTS\" HIS PCP SAID HE WOULD HAVE TO GO THROUGH CARDIO TO GET ONE PRESCRIBED. PLEASE CALL PATIENT BACK TO ADVISE.     Is it okay if the provider responds through MyChart: YES     "

## 2025-05-29 NOTE — PROGRESS NOTES
Pt requesting repeat testosterone, lh, fsh and be referred to urology if still low. He did speak to his cardiologist at his last visit. BP controlled.

## 2025-05-29 NOTE — TELEPHONE ENCOUNTER
"I reached out to pt after discussing with APRN, White message. She is aware of message and stated \"We do not prescribe weight loss drugs, that is something that has to go through PCP.\"     I reached out to pt who is aware of FANY's response.     "

## 2025-06-10 ENCOUNTER — LAB (OUTPATIENT)
Dept: LAB | Facility: HOSPITAL | Age: 35
End: 2025-06-10
Payer: COMMERCIAL

## 2025-06-10 DIAGNOSIS — R79.89 ELEVATED LFTS: ICD-10-CM

## 2025-06-10 DIAGNOSIS — R79.89 LOW TESTOSTERONE IN MALE: ICD-10-CM

## 2025-06-10 LAB
ALBUMIN SERPL-MCNC: 4.6 G/DL (ref 3.5–5.2)
ALBUMIN/GLOB SERPL: 1.8 G/DL
ALP SERPL-CCNC: 67 U/L (ref 39–117)
ALT SERPL W P-5'-P-CCNC: 66 U/L (ref 1–41)
ANION GAP SERPL CALCULATED.3IONS-SCNC: 10.5 MMOL/L (ref 5–15)
AST SERPL-CCNC: 31 U/L (ref 1–40)
BILIRUB SERPL-MCNC: 0.6 MG/DL (ref 0–1.2)
BUN SERPL-MCNC: 17 MG/DL (ref 6–20)
BUN/CREAT SERPL: 16.7 (ref 7–25)
CALCIUM SPEC-SCNC: 9.4 MG/DL (ref 8.6–10.5)
CHLORIDE SERPL-SCNC: 105 MMOL/L (ref 98–107)
CO2 SERPL-SCNC: 26.5 MMOL/L (ref 22–29)
CREAT SERPL-MCNC: 1.02 MG/DL (ref 0.76–1.27)
EGFRCR SERPLBLD CKD-EPI 2021: 100.1 ML/MIN/1.73
FSH SERPL-ACNC: 1.19 MIU/ML
GLOBULIN UR ELPH-MCNC: 2.5 GM/DL
GLUCOSE SERPL-MCNC: 106 MG/DL (ref 65–99)
LH SERPL-ACNC: 2.69 MIU/ML
POTASSIUM SERPL-SCNC: 4.4 MMOL/L (ref 3.5–5.2)
PROT SERPL-MCNC: 7.1 G/DL (ref 6–8.5)
SODIUM SERPL-SCNC: 142 MMOL/L (ref 136–145)

## 2025-06-10 PROCEDURE — 80053 COMPREHEN METABOLIC PANEL: CPT

## 2025-06-10 PROCEDURE — 36415 COLL VENOUS BLD VENIPUNCTURE: CPT

## 2025-06-10 PROCEDURE — 84403 ASSAY OF TOTAL TESTOSTERONE: CPT

## 2025-06-10 PROCEDURE — 83001 ASSAY OF GONADOTROPIN (FSH): CPT

## 2025-06-10 PROCEDURE — 84402 ASSAY OF FREE TESTOSTERONE: CPT

## 2025-06-10 PROCEDURE — 83002 ASSAY OF GONADOTROPIN (LH): CPT

## 2025-06-13 LAB
TESTOST FREE SERPL-MCNC: 7 PG/ML (ref 8.7–25.1)
TESTOST SERPL-MCNC: 187 NG/DL (ref 264–916)

## 2025-06-17 DIAGNOSIS — R79.89 LOW TESTOSTERONE: ICD-10-CM

## 2025-06-17 DIAGNOSIS — R53.83 OTHER FATIGUE: Primary | ICD-10-CM

## 2025-06-17 DIAGNOSIS — N52.8 OTHER MALE ERECTILE DYSFUNCTION: ICD-10-CM

## 2025-06-25 NOTE — PROGRESS NOTES
Pt wanting update on referral. [facility] okay [facility] to [facility]. Has actually been there in the past and will call for update. john Suarez

## 2025-08-08 DIAGNOSIS — F41.8 MIXED ANXIETY AND DEPRESSIVE DISORDER: ICD-10-CM

## 2025-08-19 ENCOUNTER — TELEPHONE (OUTPATIENT)
Dept: FAMILY MEDICINE CLINIC | Age: 35
End: 2025-08-19
Payer: COMMERCIAL

## 2025-08-19 RX ORDER — LOSARTAN POTASSIUM 50 MG/1
50 TABLET ORAL DAILY
Qty: 90 TABLET | Refills: 1 | Status: SHIPPED | OUTPATIENT
Start: 2025-08-19